# Patient Record
Sex: FEMALE | Race: WHITE | NOT HISPANIC OR LATINO | Employment: UNEMPLOYED | URBAN - NONMETROPOLITAN AREA
[De-identification: names, ages, dates, MRNs, and addresses within clinical notes are randomized per-mention and may not be internally consistent; named-entity substitution may affect disease eponyms.]

---

## 2021-10-12 ENCOUNTER — OFFICE VISIT (OUTPATIENT)
Dept: CARDIOLOGY | Facility: CLINIC | Age: 61
End: 2021-10-12

## 2021-10-12 ENCOUNTER — PATIENT ROUNDING (BHMG ONLY) (OUTPATIENT)
Dept: CARDIOLOGY | Facility: CLINIC | Age: 61
End: 2021-10-12

## 2021-10-12 VITALS
HEART RATE: 69 BPM | SYSTOLIC BLOOD PRESSURE: 160 MMHG | WEIGHT: 256 LBS | HEIGHT: 66 IN | DIASTOLIC BLOOD PRESSURE: 80 MMHG | BODY MASS INDEX: 41.14 KG/M2 | TEMPERATURE: 98.1 F

## 2021-10-12 DIAGNOSIS — R00.2 PALPITATIONS: Primary | ICD-10-CM

## 2021-10-12 DIAGNOSIS — R01.1 MURMUR, CARDIAC: ICD-10-CM

## 2021-10-12 DIAGNOSIS — K21.9 CHRONIC GERD: ICD-10-CM

## 2021-10-12 DIAGNOSIS — E66.01 MORBID OBESITY WITH BMI OF 40.0-44.9, ADULT (HCC): ICD-10-CM

## 2021-10-12 DIAGNOSIS — F41.9 ANXIETY: ICD-10-CM

## 2021-10-12 DIAGNOSIS — I10 PRIMARY HYPERTENSION: ICD-10-CM

## 2021-10-12 PROCEDURE — 99204 OFFICE O/P NEW MOD 45 MIN: CPT | Performed by: INTERNAL MEDICINE

## 2021-10-12 PROCEDURE — 93000 ELECTROCARDIOGRAM COMPLETE: CPT | Performed by: INTERNAL MEDICINE

## 2021-10-12 RX ORDER — CARVEDILOL 6.25 MG/1
6.25 TABLET ORAL 2 TIMES DAILY WITH MEALS
COMMUNITY
End: 2021-12-06 | Stop reason: ALTCHOICE

## 2021-10-12 RX ORDER — CHLORAL HYDRATE 500 MG
1000 CAPSULE ORAL 2 TIMES DAILY WITH MEALS
COMMUNITY

## 2021-10-12 RX ORDER — LORATADINE 10 MG/1
10 TABLET ORAL DAILY
COMMUNITY

## 2021-10-12 RX ORDER — PANTOPRAZOLE SODIUM 40 MG/1
40 TABLET, DELAYED RELEASE ORAL DAILY
COMMUNITY

## 2021-10-12 RX ORDER — MELATONIN
1000 DAILY
COMMUNITY

## 2021-10-12 RX ORDER — FUROSEMIDE 20 MG/1
TABLET ORAL
COMMUNITY

## 2021-10-12 RX ORDER — DICYCLOMINE HYDROCHLORIDE 10 MG/1
10 CAPSULE ORAL 2 TIMES DAILY
Qty: 60 CAPSULE | Refills: 6 | Status: SHIPPED | OUTPATIENT
Start: 2021-10-12 | End: 2022-04-21

## 2021-10-12 NOTE — PROGRESS NOTES
October 12, 2021    Hello, may I speak with Tacho Escalera?    My name is Jena Magallanes      I am  with [facility]  [facility]  [address] [place] [address] [phone].    Before we get started may I verify your date of birth? 1960    I am calling to officially welcome you to our practice and ask about your recent visit. Is this a good time to talk? yes    Tell me about your visit with us. What things went well?  Everything went good and everyone treated me good.        We're always looking for ways to make our patients' experiences even better. Do you have recommendations on ways we may improve?  No-everything went good and they talked to me and kept me from being nervous.They all treated me very well .     Overall were you satisfied with your first visit to our practice? yes       I appreciate you taking the time to speak with me today. Is there anything else I can do for you? no      Thank you, and have a great day.

## 2021-10-12 NOTE — PROGRESS NOTES
"Chief Complaint   Patient presents with   • Establish Care     Referred per PCP for HTN and palpitations . she was saw here in office in 2015 , having Stress test done with results on chart   • Hypertension     Reports her BP is usually normal at home 130/70 and is elevated  when at Dr office.   • Palpitations     Reports she feels \"fluttering \" in her chest . She said she has GI issues and cant tell if is caused from heart or stomach. she does not follow with GI    • LABS     Has some labs on chart , no Lipid panel obtained        CARDIAC COMPLAINTS  fatigue, palpitations and BP      Subjective   Tacho Escalera is a 61 y.o. female came in today for initial cardiac evaluation.  She has history of hypertension, hypercholesterolemia, anxiety who also has history of palpitation.  She was seen here almost 6 years ago for chest pain and shortness of breath.  Cardiac work-up in the form of regular stress test done at that time was inconclusive.  She never came back for any of the follow-up visit and unable to do any changes with the medication.  During the consult her beta-blockers in the form of Lopressor was changed to Coreg for better blood pressure control and heart rate control.  She apparently still continues to take the same medication but in a higher dose.  She is now referred because of palpitation.  The palpitation occurs more of a fluttering feeling in the chest.  She does have a lot of problem with her GI and anytime she has fullness in the chest the fluttering sensation gets worse.  She feels like the heart skipping and racing.  It is not associated with any dizziness or loss of consciousness.  She also claims that her blood pressure at home remains normal but when she goes to the doctor's office it goes high.  She had some labs done in May and found to have abnormal renal function.  Her GFR was normal.  She is not sure when was the last time she had a cholesterol checked.  She also has history of " hyperglycemia in the past and she is not sure when was the last time she had a sugar and A1c level checked.  At this time she denies having any chest pain.  She does have some shortness of breath which is chronic.  She does have anxiety for which she does take Klonopin.  She does not take any long-acting antianxiety medication.  She has no history of smoking.  Her mother  at the age of 80 who had a lot of comorbidity including diabetes, cancer and heart disease.  Her sister  of heart attack and kidney disease.    Past Surgical History:   Procedure Laterality Date   • CARDIOVASCULAR STRESS TEST  2015    R.Stress- 5 Min. 74% THR. BP- 156/80. Inconclusive   • CHOLECYSTECTOMY     • HERNIA REPAIR      umbilical   • HYSTERECTOMY         Current Outpatient Medications   Medication Sig Dispense Refill   • carvedilol (COREG) 6.25 MG tablet Take 6.25 mg by mouth 2 (Two) Times a Day With Meals.     • cholecalciferol (VITAMIN D3) 25 MCG (1000 UT) tablet Take 1,000 Units by mouth Daily.     • clonazePAM (KlonoPIN) 1 MG tablet Take 1 mg by mouth as needed for seizures.     • docusate sodium (COLACE) 250 MG capsule Take 250 mg by mouth Daily.     • enalapril (VASOTEC) 10 MG tablet Take 10 mg by mouth 2 (two) times a day. 2 tabs in AM, 1 tab in PM     • furosemide (LASIX) 20 MG tablet Take 20 mg by mouth As Needed. Takes 1/2 tablet PRN     • HYDROcodone-acetaminophen (NORCO)  MG per tablet Take 1 tablet by mouth 3 (three) times a day as needed for moderate pain (4-6).     • Omega-3 Fatty Acids (fish oil) 1000 MG capsule capsule Take 1,000 mg by mouth Daily With Breakfast.     • pantoprazole (PROTONIX) 40 MG EC tablet Take 40 mg by mouth Daily.     • triamterene-hydrochlorothiazide (MAXZIDE) 75-50 MG per tablet Take 1 tablet by mouth daily.     • dicyclomine (BENTYL) 10 MG capsule Take 1 capsule by mouth 2 (Two) Times a Day. 60 capsule 6   • loratadine (CLARITIN) 10 MG tablet Take 10 mg by mouth Daily.       No  current facility-administered medications for this visit.           ALLERGIES:  Azithromycin, Bactrim [sulfamethoxazole-trimethoprim], Cephalexin, Other, Penicillins, Sulfa antibiotics, and Trimethoprim    Past Medical History:   Diagnosis Date   • Anxiety     panic attacks   • Cancer (HCC)     uterine   • Chronic back pain    • Depression    • Diabetes mellitus (HCC)    • GERD (gastroesophageal reflux disease)    • GERD (gastroesophageal reflux disease)    • H/O hernia repair    • H/O: hysterectomy    • Heart murmur    • History of cholecystectomy    • Hx of appendectomy    • Hypertension        Social History     Tobacco Use   Smoking Status Former Smoker   Smokeless Tobacco Never Used   Tobacco Comment    Quit 2006          Family History   Problem Relation Age of Onset   • Heart attack Mother         multiple. First in 40's   • Hypertension Mother    • Diabetes Mother    • Cancer Mother    • Heart disease Mother    • No Known Problems Father    • Heart disease Other         palpatations   • Hypertension Sister    • Heart attack Sister    • Kidney disease Sister        Review of Systems   Constitutional: Positive for malaise/fatigue and weight gain. Negative for decreased appetite.   HENT: Negative for congestion and sore throat.    Eyes: Negative for blurred vision.   Cardiovascular: Positive for palpitations. Negative for chest pain.   Respiratory: Negative for shortness of breath and snoring.    Endocrine: Negative for cold intolerance and heat intolerance.   Hematologic/Lymphatic: Negative for adenopathy. Does not bruise/bleed easily.   Skin: Negative for itching, nail changes and skin cancer.   Musculoskeletal: Negative for arthritis and myalgias.   Gastrointestinal: Negative for abdominal pain, dysphagia and heartburn.   Genitourinary: Negative for bladder incontinence and frequency.   Neurological: Negative for dizziness, light-headedness, seizures and vertigo.   Psychiatric/Behavioral: Negative for  "altered mental status.   Allergic/Immunologic: Negative for environmental allergies and hives.       Diabetes- No  Thyroid- normal    Objective     /80 (BP Location: Left arm)   Pulse 69   Temp 98.1 °F (36.7 °C)   Ht 167.6 cm (66\")   Wt 116 kg (256 lb)   BMI 41.32 kg/m²     Vitals and nursing note reviewed.   Constitutional:       Appearance: Healthy appearance. Not in distress.   Eyes:      Conjunctiva/sclera: Conjunctivae normal.      Pupils: Pupils are equal, round, and reactive to light.   HENT:      Head: Normocephalic.   Pulmonary:      Effort: Pulmonary effort is normal.      Breath sounds: Normal breath sounds.   Cardiovascular:      PMI at left midclavicular line. Normal rate. Regular rhythm.      Murmurs: There is a systolic murmur.   Abdominal:      General: Bowel sounds are normal.      Palpations: Abdomen is soft.   Musculoskeletal: Normal range of motion.      Cervical back: Normal range of motion and neck supple. Skin:     General: Skin is warm and dry.   Neurological:      Mental Status: Alert, oriented to person, place, and time and oriented to person, place and time.           ECG 12 Lead    Date/Time: 10/12/2021 1:17 PM  Performed by: Ramonita Kebede MD  Authorized by: Ramonita Kebede MD   Comparison: compared with previous ECG from 5/27/2021  Comparison to previous ECG: No PAC today  Rhythm: sinus rhythm  Rate: normal  QRS axis: normal  Other findings: low voltage    Clinical impression: non-specific ECG              Assessment/Plan   Patient's Body mass index is 41.32 kg/m². indicating that she is morbidly obese (BMI > 40 or > 35 with obesity - related health condition). Obesity-related health conditions include the following: hypertension and GERD. Obesity is newly identified. BMI is is above average; BMI management plan is completed. We discussed low calorie, low carb based diet program, portion control and increasing exercise..     Diagnoses and all orders for this " visit:    1. Palpitations (Primary)  -     Adult Transthoracic Echo Complete W/ Cont if Necessary Per Protocol; Future  -     Mobile Cardiac Outpatient Telemetry; Future    2. Primary hypertension    3. Morbid obesity with BMI of 40.0-44.9, adult ([facility])    4. Murmur, cardiac  -     Adult Transthoracic Echo Complete W/ Cont if Necessary Per Protocol; Future    5. Anxiety    6. Chronic GERD  -     dicyclomine (BENTYL) 10 MG capsule; Take 1 capsule by mouth 2 (Two) Times a Day.  Dispense: 60 capsule; Refill: 6    At baseline her heart rate is stable.  Her blood pressure is moderately elevated.  Her BMI is around 41.  She has gained almost 38 pounds in the last 6 years.  Her cardiovascular examination is unremarkable other than a short systolic murmur at the mitral area.    Regarding the palpitation which is the predominant problem she has at this time continue the beta-blockers.  I did place Holter monitor to evaluate for the arrhythmia.  If it is due to multiple PAC's or PVC then will consider adding in 1C antiarrhythmic medication.  If it is due to sinus tachycardia alone then we can increase the dose of beta-blockers.    Regarding her hypertension, it seems to be controlled with a combination of Coreg and Vasotec.  Continue the same and continue the Maxide.  She needs to have her renal function checked along with the electrolytes.    Regarding the obesity, I had a long talk with her about the increase in the weight.  I talked to her about diet.  I gave her papers on Mediterranean diet.  I also talked to her about intermittent fasting diet    Regarding the cardiac murmur, it appears to be secondary to mitral regurgitation.  This may be again from the blood pressure itself.  I did schedule her to undergo an echocardiogram to evaluate for LVH, LV function, valvular structures as well as the PA pressure.    Regarding her anxiety, I did talk to her about taking something long-acting also.  She is advised to talk to you  about it.    Regarding her chronic GERD, continue the PPI.  I did add Bentyl at twice a day.    Based on the results, further recommendations will be made.               Electronically signed by Ramonita Kebede MD October 12, 2021 13:08 EDT

## 2021-10-12 NOTE — PATIENT INSTRUCTIONS
Mediterranean Diet  A Mediterranean diet refers to food and lifestyle choices that are based on the traditions of countries located on the [place]. This way of eating has been shown to help prevent certain conditions and improve outcomes for people who have chronic diseases, like kidney disease and heart disease.  What are tips for following this plan?  Lifestyle  · Cook and eat meals together with your family, when possible.  · Drink enough fluid to keep your urine clear or pale yellow.  · Be physically active every day. This includes:  ? Aerobic exercise like running or swimming.  ? Leisure activities like gardening, walking, or housework.  · Get 7-8 hours of sleep each night.  · If recommended by your health care provider, drink red wine in moderation. This means 1 glass a day for nonpregnant women and 2 glasses a day for men. A glass of wine equals 5 oz (150 mL).  Reading food labels    · Check the serving size of packaged foods. For foods such as rice and pasta, the serving size refers to the amount of cooked product, not dry.  · Check the total fat in packaged foods. Avoid foods that have saturated fat or trans fats.  · Check the ingredients list for added sugars, such as corn syrup.    Shopping  · At the grocery store, buy most of your food from the areas near the walls of the store. This includes:  ? Fresh fruits and vegetables (produce).  ? Grains, beans, nuts, and seeds. Some of these may be available in unpackaged forms or large amounts (in bulk).  ? Fresh seafood.  ? Poultry and eggs.  ? Low-fat dairy products.  · Buy whole ingredients instead of prepackaged foods.  · Buy fresh fruits and vegetables in-season from local farmers markets.  · Buy frozen fruits and vegetables in resealable bags.  · If you do not have access to quality fresh seafood, buy precooked frozen shrimp or canned fish, such as tuna, salmon, or sardines.  · Buy small amounts of raw or cooked vegetables, salads, or olives from  the deli or salad bar at your store.  · Stock your pantry so you always have certain foods on hand, such as olive oil, canned tuna, canned tomatoes, rice, pasta, and beans.  Cooking  · Cook foods with extra-virgin olive oil instead of using butter or other vegetable oils.  · Have meat as a side dish, and have vegetables or grains as your main dish. This means having meat in small portions or adding small amounts of meat to foods like pasta or stew.  · Use beans or vegetables instead of meat in common dishes like chili or lasagna.  · [place] with different cooking methods. Try roasting or broiling vegetables instead of steaming or sautéeing them.  · Add frozen vegetables to soups, stews, pasta, or rice.  · Add nuts or seeds for added healthy fat at each meal. You can add these to yogurt, salads, or vegetable dishes.  · Marinate fish or vegetables using olive oil, lemon juice, garlic, and fresh herbs.  Meal planning    · Plan to eat 1 vegetarian meal one day each week. Try to work up to 2 vegetarian meals, if possible.  · Eat seafood 2 or more times a week.  · Have healthy snacks readily available, such as:  ? Vegetable sticks with hummus.  ? Greek yogurt.  ? Fruit and nut trail mix.  · Eat balanced meals throughout the week. This includes:  ? Fruit: 2-3 servings a day  ? Vegetables: 4-5 servings a day  ? Low-fat dairy: 2 servings a day  ? Fish, poultry, or lean meat: 1 serving a day  ? Beans and legumes: 2 or more servings a week  ? Nuts and seeds: 1-2 servings a day  ? Whole grains: 6-8 servings a day  ? Extra-virgin olive oil: 3-4 servings a day  · Limit red meat and sweets to only a few servings a month    What are my food choices?  · Mediterranean diet  ? Recommended  § Grains: Whole-grain pasta. Brown rice. Bulgar wheat. Polenta. Couscous. Whole-wheat bread. Oatmeal. Quinoa.  § Vegetables: Artichokes. Beets. Broccoli. Cabbage. Carrots. Eggplant. Green beans. Chard. Kale. Spinach. Onions. Leeks. Peas. Squash.  Tomatoes. Peppers. Radishes.  § Fruits: Apples. Apricots. Avocado. Berries. Bananas. Cherries. Dates. Figs. Grapes. [place]. Melon. Oranges. Peaches. Plums. Pomegranate.  § Meats and other protein foods: Beans. Almonds. Sunflower seeds. Pine nuts. Peanuts. Cod. [place]. Scallops. Shrimp. Tuna. Tilapia. Clams. Oysters. Eggs.  § Dairy: Low-fat milk. Cheese. Greek yogurt.  § Beverages: Water. Red wine. Herbal tea.  § Fats and oils: Extra virgin olive oil. Avocado oil. Grape seed oil.  § Sweets and desserts: Greek yogurt with honey. Baked apples. Poached pears. Trail mix.  § Seasoning and other foods: Basil. Cilantro. Coriander. Cumin. Mint. Parsley. [place]. Rosemary. Tarragon. Garlic. Oregano. Thyme. Pepper. Balsalmic vinegar. Tahini. Hummus. Tomato sauce. Olives. Mushrooms.  ? Limit these  § Grains: Prepackaged pasta or rice dishes. Prepackaged cereal with added sugar.  § Vegetables: Deep fried potatoes (french fries).  § Fruits: Fruit canned in syrup.  § Meats and other protein foods: Beef. Pork. Lamb. Poultry with skin. Hot dogs. [place].  § Dairy: Ice cream. Sour cream. Whole milk.  § Beverages: Juice. Sugar-sweetened soft drinks. Beer. Liquor and spirits.  § Fats and oils: Butter. Canola oil. Vegetable oil. Beef fat (tallow). Lard.  § Sweets and desserts: Cookies. Cakes. Pies. Candy.  § Seasoning and other foods: Mayonnaise. Premade sauces and marinades.  The items listed may not be a complete list. Talk with your dietitian about what dietary choices are right for you.  Summary  · The Mediterranean diet includes both food and lifestyle choices.  · Eat a variety of fresh fruits and vegetables, beans, nuts, seeds, and whole grains.  · Limit the amount of red meat and sweets that you eat.  · Talk with your health care provider about whether it is safe for you to drink red wine in moderation. This means 1 glass a day for nonpregnant women and 2 glasses a day for men. A glass of wine equals 5 oz (150 mL).  This information  is not intended to replace advice given to you by your health care provider. Make sure you discuss any questions you have with your health care provider.  Document Revised: 08/17/2017 Document Reviewed: 08/10/2017  Elsevier Patient Education © 2020 [employer]

## 2021-11-03 ENCOUNTER — APPOINTMENT (OUTPATIENT)
Dept: CARDIOLOGY | Facility: HOSPITAL | Age: 61
End: 2021-11-03

## 2021-12-03 ENCOUNTER — TELEPHONE (OUTPATIENT)
Dept: CARDIOLOGY | Facility: CLINIC | Age: 61
End: 2021-12-03

## 2021-12-03 NOTE — TELEPHONE ENCOUNTER
Pt called, MANUEL asking to be seen next week to discuss echo results that is to be done on the 8th. Called her back, MANUEL for her to return my call.

## 2021-12-06 RX ORDER — DILTIAZEM HYDROCHLORIDE 240 MG/1
240 CAPSULE, COATED, EXTENDED RELEASE ORAL DAILY
Qty: 90 CAPSULE | Refills: 3 | Status: SHIPPED | OUTPATIENT
Start: 2021-12-06 | End: 2021-12-20 | Stop reason: DRUGHIGH

## 2021-12-06 NOTE — TELEPHONE ENCOUNTER
I called pt back, advised her that we would be calling her with the echo results and any recommendations. Understanding voiced.

## 2021-12-06 NOTE — TELEPHONE ENCOUNTER
----- Message from Ramonita Kebede MD sent at 12/4/2021  1:55 PM EST -----  Can change Coreg to Cardizem 240 or add Flecainide

## 2021-12-06 NOTE — TELEPHONE ENCOUNTER
Pt aware of monitor results and recommendations to either change Coreg to Cardizem 240 mg or add Flecainide. Pt would like to change the Coreg to Cardizem, if that doesn't work then try the Flecainide.

## 2021-12-08 ENCOUNTER — TELEPHONE (OUTPATIENT)
Dept: CARDIOLOGY | Facility: CLINIC | Age: 61
End: 2021-12-08

## 2021-12-08 ENCOUNTER — HOSPITAL ENCOUNTER (OUTPATIENT)
Dept: CARDIOLOGY | Facility: HOSPITAL | Age: 61
Discharge: HOME | End: 2021-12-08
Admitting: INTERNAL MEDICINE

## 2021-12-08 VITALS — WEIGHT: 255.73 LBS | HEIGHT: 66 IN | BODY MASS INDEX: 41.1 KG/M2

## 2021-12-08 DIAGNOSIS — R00.2 PALPITATIONS: ICD-10-CM

## 2021-12-08 DIAGNOSIS — R01.1 MURMUR, CARDIAC: ICD-10-CM

## 2021-12-08 LAB
AORTIC DIMENSIONLESS INDEX: 0.7 (DI)
BH CV ECHO MEAS - ACS: 1.8 CM
BH CV ECHO MEAS - AO MAX PG (FULL): 6.2 MMHG
BH CV ECHO MEAS - AO MAX PG: 14.7 MMHG
BH CV ECHO MEAS - AO MEAN PG (FULL): 3 MMHG
BH CV ECHO MEAS - AO MEAN PG: 7 MMHG
BH CV ECHO MEAS - AO ROOT AREA (BSA CORRECTED): 1.5
BH CV ECHO MEAS - AO ROOT AREA: 8.6 CM^2
BH CV ECHO MEAS - AO ROOT DIAM: 3.3 CM
BH CV ECHO MEAS - AO V2 MAX: 192 CM/SEC
BH CV ECHO MEAS - AO V2 MEAN: 124 CM/SEC
BH CV ECHO MEAS - AO V2 VTI: 42 CM
BH CV ECHO MEAS - BSA(HAYCOCK): 2.4 M^2
BH CV ECHO MEAS - BSA: 2.2 M^2
BH CV ECHO MEAS - BZI_BMI: 41.3 KILOGRAMS/M^2
BH CV ECHO MEAS - BZI_METRIC_HEIGHT: 167.6 CM
BH CV ECHO MEAS - BZI_METRIC_WEIGHT: 116.1 KG
BH CV ECHO MEAS - EDV(CUBED): 77.3 ML
BH CV ECHO MEAS - EDV(TEICH): 81.3 ML
BH CV ECHO MEAS - EF(CUBED): 62.6 %
BH CV ECHO MEAS - EF(MOD-BP): 54 %
BH CV ECHO MEAS - EF(TEICH): 54.4 %
BH CV ECHO MEAS - ESV(CUBED): 28.9 ML
BH CV ECHO MEAS - ESV(TEICH): 37 ML
BH CV ECHO MEAS - FS: 27.9 %
BH CV ECHO MEAS - IVS/LVPW: 1.3
BH CV ECHO MEAS - IVSD: 1.3 CM
BH CV ECHO MEAS - LA DIMENSION: 4.3 CM
BH CV ECHO MEAS - LA/AO: 1.3
BH CV ECHO MEAS - LAT PEAK E' VEL: 11.3 CM/SEC
BH CV ECHO MEAS - LV IVRT: 0.1 SEC
BH CV ECHO MEAS - LV MASS(C)D: 181 GRAMS
BH CV ECHO MEAS - LV MASS(C)DI: 81.5 GRAMS/M^2
BH CV ECHO MEAS - LV MAX PG: 8.5 MMHG
BH CV ECHO MEAS - LV MEAN PG: 4 MMHG
BH CV ECHO MEAS - LV V1 MAX: 146 CM/SEC
BH CV ECHO MEAS - LV V1 MEAN: 89.5 CM/SEC
BH CV ECHO MEAS - LV V1 VTI: 35.9 CM
BH CV ECHO MEAS - LVIDD: 4.3 CM
BH CV ECHO MEAS - LVIDS: 3.1 CM
BH CV ECHO MEAS - LVPWD: 1.1 CM
BH CV ECHO MEAS - MED PEAK E' VEL: 7.8 CM/SEC
BH CV ECHO MEAS - MV A MAX VEL: 94.5 CM/SEC
BH CV ECHO MEAS - MV DEC SLOPE: 523 CM/SEC^2
BH CV ECHO MEAS - MV DEC TIME: 0.22 SEC
BH CV ECHO MEAS - MV E MAX VEL: 67.1 CM/SEC
BH CV ECHO MEAS - MV E/A: 0.71
BH CV ECHO MEAS - MV MAX PG: 5.6 MMHG
BH CV ECHO MEAS - MV MEAN PG: 2 MMHG
BH CV ECHO MEAS - MV P1/2T MAX VEL: 106 CM/SEC
BH CV ECHO MEAS - MV P1/2T: 59.4 MSEC
BH CV ECHO MEAS - MV V2 MAX: 118 CM/SEC
BH CV ECHO MEAS - MV V2 MEAN: 64.6 CM/SEC
BH CV ECHO MEAS - MV V2 VTI: 32 CM
BH CV ECHO MEAS - MVA P1/2T LCG: 2.1 CM^2
BH CV ECHO MEAS - MVA(P1/2T): 3.7 CM^2
BH CV ECHO MEAS - RAP SYSTOLE: 10 MMHG
BH CV ECHO MEAS - RVDD: 3.1 CM
BH CV ECHO MEAS - RVSP: 21 MMHG
BH CV ECHO MEAS - SI(AO): 161.7 ML/M^2
BH CV ECHO MEAS - SI(CUBED): 21.8 ML/M^2
BH CV ECHO MEAS - SI(TEICH): 19.9 ML/M^2
BH CV ECHO MEAS - SV(AO): 359.2 ML
BH CV ECHO MEAS - SV(CUBED): 48.4 ML
BH CV ECHO MEAS - SV(TEICH): 44.2 ML
BH CV ECHO MEAS - TR MAX VEL: 165 CM/SEC
BH CV ECHO MEASUREMENTS AVERAGE E/E' RATIO: 7.03
MAXIMAL PREDICTED HEART RATE: 158 BPM
SINUS: 3 CM
STRESS TARGET HR: 134 BPM

## 2021-12-08 PROCEDURE — 93306 TTE W/DOPPLER COMPLETE: CPT | Performed by: INTERNAL MEDICINE

## 2021-12-08 PROCEDURE — 93306 TTE W/DOPPLER COMPLETE: CPT

## 2021-12-08 NOTE — TELEPHONE ENCOUNTER
Spoke at length with patient explaining the difference in medications and mgs. Understanding voiced.

## 2021-12-08 NOTE — TELEPHONE ENCOUNTER
PATIENT CAME BY AFTER ECHO AND WAS PRACTICALLY IN TEARS.  SHE IS ON NERVE MEDICATION AND MADE IT THROUGH THE ECHO BUT WAS UPSET.  SHE WANTED TO CHECK ON THE MEDICATION SHE WAS ON COREG 6.25 AND NO POSSIBLY CARDIZEM.   SHE DID NOT UNDERSTAND WHY SHE HAD TO INCREASE TO SUCH A LARGE DOSAGE.    I LET PATIENT KNOW THAT WHEN CHANGING FROM ONE MEDICATION TO ANOTHER THAT THE DOSAGE DID NOT MEAN THEY WERE INCREASING BECAUSE THEY ARE MADE DIFFERENT AND OFFER IN DIFFERENT STRENGTHS.    SHE IS AWARE YOU WILL BE CALLING TO GO OVER.  I THINK SHE FELT A LITTLE BETTER.

## 2021-12-20 RX ORDER — DILTIAZEM HYDROCHLORIDE 120 MG/1
120 CAPSULE, COATED, EXTENDED RELEASE ORAL DAILY
Qty: 90 CAPSULE | Refills: 3 | Status: SHIPPED | OUTPATIENT
Start: 2021-12-20 | End: 2022-01-24 | Stop reason: ALTCHOICE

## 2021-12-20 NOTE — TELEPHONE ENCOUNTER
Pt aware to try Diltiazem 120 mg once daily instead of the 240 mg. Understanding voiced. Sending to pharmacy.

## 2021-12-20 NOTE — TELEPHONE ENCOUNTER
"Pt called asking for an appointment. After a long discussion, she said that she really feels like the Diltiazem 240 mg daily is \" just to strong\". Complains of extreme fatigue. Reports heart rate in the 50's, last time BP was checked 120/65 but has no way of checking it now. She stopped the Diltiazem and went back on the Coreg 6.25 mg BID. Asking if you think a lower dose of the Diltiazem would work.       Current meds include:     Maxzide 75/50 mg 1 daily  Coreg 6.25 mg BID  Enalapril 10 mg 2 tabs in AM, 1 tab in PM  Lasix 20 mg 1/2 tab PRN    "

## 2022-01-05 ENCOUNTER — TELEPHONE (OUTPATIENT)
Dept: CARDIOLOGY | Facility: CLINIC | Age: 62
End: 2022-01-05

## 2022-01-05 NOTE — TELEPHONE ENCOUNTER
"Pt LM asking to be called back, had \" a medicine question\". Called her back no answer,LM for her to call me back at her convenience.   "

## 2022-01-11 NOTE — TELEPHONE ENCOUNTER
Called pt back, she was having some issues with her BP elevated, saw PCP he adjusted medication and she is to see him today to follow up. Aware to keep scheduled appointment with us unless she needs us sooner to call.

## 2022-01-20 ENCOUNTER — TELEPHONE (OUTPATIENT)
Dept: CARDIOLOGY | Facility: CLINIC | Age: 62
End: 2022-01-20

## 2022-01-20 NOTE — TELEPHONE ENCOUNTER
Pt called, reports hearing her heart beat in one ear when she has a bowel movement, no other time. She wanted me to check with you to see if you feel it could be heart related.  BP running 130's/ 70-80, pulse in the 60's-80's. She did say she has had a lot of sinus problems and feels like she might have fluid behind her ear. I advised her to notify her PCP to be evaluated. Understanding voiced.

## 2022-01-24 RX ORDER — CARVEDILOL 6.25 MG/1
6.25 TABLET ORAL 2 TIMES DAILY
Qty: 180 TABLET | Refills: 3 | Status: SHIPPED | OUTPATIENT
Start: 2022-01-24 | End: 2022-04-21 | Stop reason: SDUPTHER

## 2022-01-24 NOTE — TELEPHONE ENCOUNTER
Pt called back, asking you to change the Cardizem 120 mg daily.  Said that she had never had those feeling with her heart beating in her head when her bowels move or been near as nervous until she started taking that. She just wants it changed. Asking if she could go back on her Coreg, was taking 6.25 mg BID and maybe just increase it.   (After monitor results you gave the choice to stop Coreg and start Cardizem or add flecainide to the Coreg. She wanted to try the Cardizem.)    Today BP was 171/77 pulse 78-92.     Current meds include:    Diltiazem  mg daily  Enalapril 10  Mg twice a day  Lasix 20 mg 1/2 tab prn  Maxzide 75/50 mg daily

## 2022-02-08 ENCOUNTER — TELEPHONE (OUTPATIENT)
Dept: CARDIOLOGY | Facility: CLINIC | Age: 62
End: 2022-02-08

## 2022-02-08 NOTE — TELEPHONE ENCOUNTER
Patient called stating that she would like an appointment with you because she had a feeling like a esophagus spasm or palpitations today, she states that she has only one episode total and that it only happened today. She states that it has gone away. She states that her HR has been 69 and BP has been around 130/60.       She is taking:  · Coreg 6.25 mg BID  · Enalapril 10 mg 2 tablets in the morning and 1 tablet in the pm  · Lasix 20 mg 1/2 tablet PRN  · maxzide 75/50 mg daily    She was made aware that I would let you know, but that since it has only happened one time, I did not think that you would need to see her. She is asking if you need to add another beta blocker or change it so that this does not happen again.

## 2022-02-09 NOTE — TELEPHONE ENCOUNTER
Patient was made aware that she could take an extra 1/2 tablet of coreg on day that she has palpitations.

## 2022-04-21 ENCOUNTER — OFFICE VISIT (OUTPATIENT)
Dept: CARDIOLOGY | Facility: CLINIC | Age: 62
End: 2022-04-21

## 2022-04-21 VITALS
DIASTOLIC BLOOD PRESSURE: 100 MMHG | HEIGHT: 66 IN | HEART RATE: 68 BPM | BODY MASS INDEX: 40.6 KG/M2 | SYSTOLIC BLOOD PRESSURE: 150 MMHG | WEIGHT: 252.6 LBS

## 2022-04-21 DIAGNOSIS — I49.3 PVC (PREMATURE VENTRICULAR CONTRACTION): ICD-10-CM

## 2022-04-21 DIAGNOSIS — K21.9 CHRONIC GERD: ICD-10-CM

## 2022-04-21 DIAGNOSIS — I10 PRIMARY HYPERTENSION: Primary | ICD-10-CM

## 2022-04-21 DIAGNOSIS — F41.9 ANXIETY: ICD-10-CM

## 2022-04-21 DIAGNOSIS — R00.2 PALPITATIONS: ICD-10-CM

## 2022-04-21 DIAGNOSIS — I49.1 PREMATURE ATRIAL CONTRACTIONS: ICD-10-CM

## 2022-04-21 PROCEDURE — 99214 OFFICE O/P EST MOD 30 MIN: CPT | Performed by: NURSE PRACTITIONER

## 2022-04-21 RX ORDER — CARVEDILOL 6.25 MG/1
6.25 TABLET ORAL 2 TIMES DAILY
Qty: 270 TABLET | Refills: 3 | Status: SHIPPED | OUTPATIENT
Start: 2022-04-21 | End: 2022-06-14 | Stop reason: SDUPTHER

## 2022-04-21 RX ORDER — CLONAZEPAM 0.5 MG/1
0.5 TABLET ORAL 2 TIMES DAILY PRN
COMMUNITY
End: 2022-11-07

## 2022-04-21 NOTE — PROGRESS NOTES
Chief Complaint   Patient presents with   • Follow-up     Cardiac management     • Lab     Last labs yesterday per PCP.   • Palpitations     Has occasional flutter, not often. Heart rate doing much better since stopping Diltiazem.    • Med Refill     Does not need refills at this time. Patient went over medications verbally.   • Blood pressure     She monitors at home, averages 130//70. Reports that she gets nervous when coming to doctor.     Subjective       Brianna Escalera is a 62 y.o. female with HTN, hypercholesterolemia and palpitations who also has significant anxiety.  She was referred back in October 2021 after not being seen for about 6 years.  Past cardiac work-up included regular stress test which was inconclusive.  Lopressor was changed to Coreg for better BP and HR control.  When she returned, she was still taking carvedilol but admitted to increasing palpitations and fluttering.  She associated much of her symptoms with GI fullness which triggered the fluttering.  She also claimed that her blood pressure at home was normal but goes high when she is at the doctor's office.  Echocardiogram and Holter monitor ordered at consult.  She had normal LV function, mild MR and normal PA pressure.  Holter worn for 1 day showed average heart rate 63 with 6% PACs and 1% PVC.  We tried putting her on diltiazem but she did not tolerate and wanted to go back on carvedilol. Labs 5/25/2021 showed GFR 71, normal sodium and potassium.    She returns today for follow-up visit.  She denies chest pain but continues to have occasional flutter but not often.  She at this time feels she is well controlled.  There was discussion about changing beta-blocker to 1C antiarrhythmic for persistent palpitations.  Her labs were done yesterday at [facility].  Copy is not available.  BP at home ranges 130-150 systolic.         Cardiac History:    Past Surgical History:   Procedure Laterality Date   • CARDIOVASCULAR STRESS  TEST  11/17/2015    R.Stress- 5 Min. 74% THR. BP- 156/80. Inconclusive   • CHOLECYSTECTOMY     • ECHO - CONVERTED  12/08/2021    TLS.EF 60%. Trace -Mild MR. RVSP- 21 mmHg   • HERNIA REPAIR      umbilical   • HYSTERECTOMY     • OTHER SURGICAL HISTORY  12/04/2021    Event monitor- 1 day. Avg 63. . 6% PAC. 1 % PVC     Current Outpatient Medications   Medication Sig Dispense Refill   • carvedilol (COREG) 6.25 MG tablet Take 1 tablet by mouth 2 (Two) Times a Day. Take extra tablet as needed 270 tablet 3   • clonazePAM (KlonoPIN) 0.5 MG tablet Take 0.5 mg by mouth 2 (Two) Times a Day As Needed. 2 tablets in morning, 1 tablet at noon and 1 tablet at night.     • docusate sodium (COLACE) 250 MG capsule Take 250 mg by mouth Daily.     • enalapril (VASOTEC) 10 MG tablet 2 tabs in AM, 1 tab in PM     • furosemide (LASIX) 20 MG tablet Takes 1/2 tablet PRN     • HYDROcodone-acetaminophen (NORCO)  MG per tablet Take 1 tablet by mouth 3 (three) times a day as needed for moderate pain (4-6).     • loratadine (CLARITIN) 10 MG tablet Take 10 mg by mouth Daily.     • Omega-3 Fatty Acids (fish oil) 1000 MG capsule capsule Take 1,000 mg by mouth 2 (Two) Times a Day With Meals.     • pantoprazole (PROTONIX) 40 MG EC tablet Take 40 mg by mouth Daily.     • triamterene-hydrochlorothiazide (MAXZIDE) 75-50 MG per tablet Take 1 tablet by mouth daily.     • cholecalciferol (VITAMIN D3) 25 MCG (1000 UT) tablet Take 1,000 Units by mouth Daily.       No current facility-administered medications for this visit.     Azithromycin, Bactrim [sulfamethoxazole-trimethoprim], Cephalexin, Diltiazem, Norvasc [amlodipine], Other, Penicillins, Sulfa antibiotics, and Trimethoprim    Past Medical History:   Diagnosis Date   • Anxiety     panic attacks   • Cancer (HCC)     uterine   • Chronic back pain    • Depression    • Diabetes mellitus (HCC)    • GERD (gastroesophageal reflux disease)    • GERD (gastroesophageal reflux disease)    • H/O hernia  "repair    • H/O: hysterectomy    • Heart murmur    • History of cholecystectomy    • Hx of appendectomy    • Hypertension      Social History     Socioeconomic History   • Marital status:    Tobacco Use   • Smoking status: Former Smoker   • Smokeless tobacco: Never Used   • Tobacco comment: Quit 2006   Vaping Use   • Vaping Use: Never used   Substance and Sexual Activity   • Alcohol use: No   • Drug use: Never   • Sexual activity: Defer     Family History   Problem Relation Age of Onset   • Heart attack Mother         multiple. First in 40's   • Hypertension Mother    • Diabetes Mother    • Cancer Mother    • Heart disease Mother    • No Known Problems Father    • Heart disease Other         palpatations   • Hypertension Sister    • Heart attack Sister    • Kidney disease Sister      Review of Systems   Constitutional: Positive for weight loss (Down 3 pounds). Negative for decreased appetite and malaise/fatigue.   HENT: Negative.    Eyes: Negative for blurred vision.   Cardiovascular: Positive for dyspnea on exertion and palpitations. Negative for chest pain, leg swelling and syncope.   Respiratory: Negative for shortness of breath and sleep disturbances due to breathing.    Endocrine: Negative.    Hematologic/Lymphatic: Negative for bleeding problem. Does not bruise/bleed easily.   Skin: Negative.    Musculoskeletal: Negative for falls and myalgias.   Gastrointestinal: Negative for abdominal pain, heartburn and melena.   Genitourinary: Negative for hematuria.   Neurological: Negative for dizziness and light-headedness.   Psychiatric/Behavioral: Negative for altered mental status. The patient is nervous/anxious.    Allergic/Immunologic: Negative.       Objective     /100 (BP Location: Left arm)   Pulse 68   Ht 167 cm (65.75\")   Wt 115 kg (252 lb 9.6 oz)   BMI 41.08 kg/m²     Vitals and nursing note reviewed.   Constitutional:       General: Not in acute distress.     Appearance: Well-developed. Not " diaphoretic.   Eyes:      Pupils: Pupils are equal, round, and reactive to light.   HENT:      Head: Normocephalic.   Pulmonary:      Effort: Pulmonary effort is normal. No respiratory distress.      Breath sounds: Normal breath sounds.   Cardiovascular:      Normal rate. Regular rhythm.      Murmurs: There is a grade 1/6 systolic murmur.   Pulses:     Intact distal pulses.   Abdominal:      General: Bowel sounds are normal.      Palpations: Abdomen is soft.   Musculoskeletal: Normal range of motion.      Cervical back: Normal range of motion. Skin:     General: Skin is warm and dry.   Neurological:      Mental Status: Alert and oriented to person, place, and time.        Procedures          Problem List Items Addressed This Visit        Cardiac and Vasculature    Primary hypertension - Primary    Relevant Medications    carvedilol (COREG) 6.25 MG tablet    Palpitations    Premature atrial contractions    Relevant Medications    carvedilol (COREG) 6.25 MG tablet    PVC (premature ventricular contraction)    Relevant Medications    carvedilol (COREG) 6.25 MG tablet       Gastrointestinal Abdominal     Chronic GERD       Mental Health    Anxiety         1.  Palpitations- Holter revealed PACs and PVCs, reviewed with patient.  Did not tolerate calcium channel blocker.  We discussed changing carvedilol to 1C antiarrhythmic but she feels she is fairly well controlled.  She was advised to take extra dose of carvedilol as needed for palpitations.  Refill sent.  Labs including TSH and mag followed by PCP.  Could we get copy of most recent labs?    2.  HTN- elevated at 150/100, patient admits to whitecoat syndrome.  Checked at end of visit, improved to 146/92.  Since she has normal readings at home, advised to continue Maxide, enalapril and Coreg.  If blood pressure elevates, consider increasing enalapril to 20 mg twice daily.  Limit sodium.  Continue weight loss efforts.    3.  Chronic GERD- she has responded well to  dicyclomine, continue the same with PPI.    4.  Hypercholesterolemia-appears to be managed with diet and fish oil.  Could we get copy of her most recent lipids?    She appears stable.  Echo and Holter reviewed with her.  We will see her back in 6 months or sooner if needed.    Patient's Body mass index is 41.08 kg/m². indicating that she is obese (BMI >30). Obesity-related health conditions include the following: obstructive sleep apnea, hypertension, coronary heart disease, diabetes mellitus, dyslipidemias and GERD. Obesity is improving with lifestyle modifications. BMI is is above average; BMI management plan is completed. We discussed portion control and increasing exercise..             Electronically signed by MONIQUE Orona,  April 25, 2022 08:29 EDT

## 2022-04-25 PROBLEM — I49.3 PVC (PREMATURE VENTRICULAR CONTRACTION): Status: ACTIVE | Noted: 2022-04-25

## 2022-04-25 PROBLEM — I49.1 PREMATURE ATRIAL CONTRACTIONS: Status: ACTIVE | Noted: 2022-04-25

## 2022-04-25 PROBLEM — Z15.89 MONOALLELIC MUTATION OF PACS1 GENE: Status: ACTIVE | Noted: 2022-04-25

## 2022-04-25 PROBLEM — Z15.89 MONOALLELIC MUTATION OF PACS1 GENE: Status: RESOLVED | Noted: 2022-04-25 | Resolved: 2022-04-25

## 2022-05-11 DIAGNOSIS — K21.9 CHRONIC GERD: ICD-10-CM

## 2022-05-11 RX ORDER — DICYCLOMINE HYDROCHLORIDE 10 MG/1
CAPSULE ORAL
Qty: 60 CAPSULE | Refills: 5 | OUTPATIENT
Start: 2022-05-11

## 2022-06-09 ENCOUNTER — TELEPHONE (OUTPATIENT)
Dept: CARDIOLOGY | Facility: CLINIC | Age: 62
End: 2022-06-09

## 2022-06-10 NOTE — TELEPHONE ENCOUNTER
Looks like Ramandeep has an opening Monday at 9. Since she saw her before, will this work for patient?

## 2022-06-10 NOTE — TELEPHONE ENCOUNTER
"I spoke with patient and she just wanted to speak with someone  about her \"fluttering \" in chest  .HR is normal in the 60's /69 . She wants to move appointment up .  "

## 2022-06-14 ENCOUNTER — OFFICE VISIT (OUTPATIENT)
Dept: CARDIOLOGY | Facility: CLINIC | Age: 62
End: 2022-06-14

## 2022-06-14 VITALS
HEIGHT: 66 IN | DIASTOLIC BLOOD PRESSURE: 80 MMHG | BODY MASS INDEX: 39.7 KG/M2 | HEART RATE: 68 BPM | SYSTOLIC BLOOD PRESSURE: 130 MMHG | WEIGHT: 247 LBS

## 2022-06-14 DIAGNOSIS — I10 PRIMARY HYPERTENSION: Primary | ICD-10-CM

## 2022-06-14 DIAGNOSIS — I49.3 PVC (PREMATURE VENTRICULAR CONTRACTION): ICD-10-CM

## 2022-06-14 DIAGNOSIS — R00.2 PALPITATIONS: ICD-10-CM

## 2022-06-14 DIAGNOSIS — K21.9 CHRONIC GERD: ICD-10-CM

## 2022-06-14 DIAGNOSIS — F41.9 ANXIETY: ICD-10-CM

## 2022-06-14 DIAGNOSIS — I49.1 PREMATURE ATRIAL CONTRACTIONS: ICD-10-CM

## 2022-06-14 PROCEDURE — 99214 OFFICE O/P EST MOD 30 MIN: CPT | Performed by: NURSE PRACTITIONER

## 2022-06-14 RX ORDER — CARVEDILOL 6.25 MG/1
TABLET ORAL
Qty: 270 TABLET | Refills: 3 | Status: SHIPPED | OUTPATIENT
Start: 2022-06-14

## 2022-06-14 RX ORDER — DICYCLOMINE HYDROCHLORIDE 10 MG/1
10 CAPSULE ORAL 2 TIMES DAILY
Qty: 60 CAPSULE | Refills: 8 | Status: SHIPPED | OUTPATIENT
Start: 2022-06-14

## 2022-06-14 NOTE — PROGRESS NOTES
Chief Complaint   Patient presents with   • Follow-up     Cardiac management     • Lab     Last labs 2 months ago per PCP.   • Medication     She has questions concerning Coreg dosing. She is having episodes of fluttering at times, unable to determine if issue from stomach issues or anxiety. She reports heart rate has been in the 60's. Has noticed at times fluttering relieved with use of Mylanta.     Subjective       Brianna Escalera is a 62 y.o. female with HTN, hypercholesterolemia and palpitations who also has significant anxiety.  She was referred back in October 2021 after not being seen for about 6 years.  Stress test in 2015 was inconclusive.  Lopressor changed to Coreg for better BP and HR control.  When she returned, she was still taking carvedilol but admitted to increasing palpitations and fluttering.  She associated much of her symptoms with GI fullness triggering the fluttering.  Echo and Holter showed normal LV function, average HR 63 with 6% PACs and 1% PVC.  We tried diltiazem but did not tolerate.  She wanted to go back on carvedilol. Labs 5/25/2021 showed GFR 71, normal sodium and potassium.     She returns today to be evaluated for increasing palpitations. She is not sure if heart flutters and skips due to anxiety and stress as palpitations worsened after stopping valium and starting clonazepam. He daughter recently passed away from cancer and she has been very depressed. She also notes increasing palpitations after eating. She takes Mylanta and palpitations improve. Labs followed by PCP.            Cardiac History:    Past Surgical History:   Procedure Laterality Date   • CARDIOVASCULAR STRESS TEST  11/17/2015    R.Stress- 5 Min. 74% THR. BP- 156/80. Inconclusive   • ECHO - CONVERTED  12/08/2021    TLS.EF 60%. Trace -Mild MR. RVSP- 21 mmHg   • OTHER SURGICAL HISTORY  12/04/2021    Event monitor- 1 day. Avg 63. . 6% PAC. 1 % PVC     Current Outpatient Medications   Medication Sig Dispense  Refill   • carvedilol (COREG) 6.25 MG tablet Take 2 tablets in am, 1 tablet in pm 270 tablet 3   • cholecalciferol (VITAMIN D3) 25 MCG (1000 UT) tablet Take 1,000 Units by mouth Daily.     • clonazePAM (KlonoPIN) 0.5 MG tablet Take 0.5 mg by mouth 2 (Two) Times a Day As Needed. 2 tablets in morning, 1 tablet at noon and 1 tablet at night.     • docusate sodium (COLACE) 250 MG capsule Take 250 mg by mouth Daily.     • enalapril (VASOTEC) 10 MG tablet 2 tabs in AM, 1 tab in PM     • furosemide (LASIX) 20 MG tablet Takes 1/2 tablet PRN     • HYDROcodone-acetaminophen (NORCO)  MG per tablet Take 1 tablet by mouth 3 (three) times a day as needed for moderate pain (4-6).     • loratadine (CLARITIN) 10 MG tablet Take 10 mg by mouth Daily.     • Omega-3 Fatty Acids (fish oil) 1000 MG capsule capsule Take 1,000 mg by mouth 2 (Two) Times a Day With Meals.     • pantoprazole (PROTONIX) 40 MG EC tablet Take 40 mg by mouth Daily.     • triamterene-hydrochlorothiazide (MAXZIDE) 75-50 MG per tablet Take 1 tablet by mouth daily.     • dicyclomine (Bentyl) 10 MG capsule Take 1 capsule by mouth 2 (Two) Times a Day. Before meals 60 capsule 8     No current facility-administered medications for this visit.     Azithromycin, Bactrim [sulfamethoxazole-trimethoprim], Cephalexin, Diltiazem, Norvasc [amlodipine], Other, Penicillins, Sulfa antibiotics, and Trimethoprim    Past Medical History:   Diagnosis Date   • Anxiety     panic attacks   • Cancer (HCC)     uterine   • Chronic back pain    • Depression    • Diabetes mellitus (HCC)    • GERD (gastroesophageal reflux disease)    • GERD (gastroesophageal reflux disease)    • H/O hernia repair    • H/O: hysterectomy    • Heart murmur    • History of cholecystectomy    • Hx of appendectomy    • Hypertension      Social History     Socioeconomic History   • Marital status:    Tobacco Use   • Smoking status: Former Smoker   • Smokeless tobacco: Never Used   • Tobacco comment: Quit  "2006   Vaping Use   • Vaping Use: Never used   Substance and Sexual Activity   • Alcohol use: No   • Drug use: Never   • Sexual activity: Defer     Family History   Problem Relation Age of Onset   • Heart attack Mother         multiple. First in 40's   • Hypertension Mother    • Diabetes Mother    • Cancer Mother    • Heart disease Mother    • No Known Problems Father    • Heart disease Other         palpatations   • Hypertension Sister    • Heart attack Sister    • Kidney disease Sister      Review of Systems   Constitutional: Negative for decreased appetite and malaise/fatigue.   HENT: Negative.    Eyes: Negative for blurred vision.   Cardiovascular: Positive for palpitations. Negative for chest pain, dyspnea on exertion, leg swelling and syncope.   Respiratory: Negative for shortness of breath and sleep disturbances due to breathing.    Endocrine: Negative.    Hematologic/Lymphatic: Negative for bleeding problem. Does not bruise/bleed easily.   Skin: Negative.    Musculoskeletal: Negative for falls and myalgias.   Gastrointestinal: Negative for abdominal pain, heartburn and melena.   Genitourinary: Negative for hematuria.   Neurological: Negative for dizziness and light-headedness.   Psychiatric/Behavioral: Negative for altered mental status.   Allergic/Immunologic: Negative.       Objective     /80 (BP Location: Left arm)   Pulse 68   Ht 167 cm (65.75\")   Wt 112 kg (247 lb)   BMI 40.17 kg/m²     Vitals and nursing note reviewed.   Constitutional:       General: Not in acute distress.     Appearance: Well-developed. Not diaphoretic.   Eyes:      Pupils: Pupils are equal, round, and reactive to light.   HENT:      Head: Normocephalic.   Pulmonary:      Effort: Pulmonary effort is normal. No respiratory distress.      Breath sounds: Normal breath sounds.   Cardiovascular:      Normal rate. Regular rhythm.   Pulses:     Intact distal pulses.   Edema:     Peripheral edema absent.   Abdominal:      General: " Bowel sounds are normal.      Palpations: Abdomen is soft.   Musculoskeletal: Normal range of motion.      Cervical back: Normal range of motion. Skin:     General: Skin is warm and dry.   Neurological:      Mental Status: Alert and oriented to person, place, and time.        Procedures          Problem List Items Addressed This Visit        Cardiac and Vasculature    Primary hypertension - Primary    Relevant Medications    carvedilol (COREG) 6.25 MG tablet    Palpitations    Premature atrial contractions    Relevant Medications    carvedilol (COREG) 6.25 MG tablet    PVC (premature ventricular contraction)    Relevant Medications    carvedilol (COREG) 6.25 MG tablet       Gastrointestinal Abdominal     Chronic GERD    Relevant Medications    dicyclomine (Bentyl) 10 MG capsule       Mental Health    Anxiety         1. Palpitations- PAC and PVC per holter. Increase Coreg to 12.5 mg in am, 6.25mg in pm. Monitor HR. If becomes bradycardic, will try 6.25 mg TID. She feels comfortable with Coreg as she has not tolerated other bb/ccb. If palpitations remain frequent, will add flecainide. Check TSH, MAG, electrolytes per PCP.     2. HTN- well controlled at 130/80. Continue low sodium diet. She has lost 5 lb.     3. Chronic GERD- palpitations relieved with Mylanta, advised to resume Bentyl. Continue PPI.     4. Hyperlipidemia- managed with fish oil and diet. Could we get copy of most recent labs?     Class 3 Severe Obesity (BMI >=40). Obesity-related health conditions include the following: obstructive sleep apnea, hypertension, coronary heart disease, diabetes mellitus, dyslipidemias and GERD. Obesity is improving with lifestyle modifications. BMI is is above average; BMI management plan is completed. We discussed portion control and increasing exercise.               Electronically signed by MONIQUE Orona,  Regina 15, 2022 08:27 EDT

## 2022-09-14 ENCOUNTER — TELEPHONE (OUTPATIENT)
Dept: CARDIOLOGY | Facility: CLINIC | Age: 62
End: 2022-09-14

## 2022-09-14 NOTE — TELEPHONE ENCOUNTER
Lets try increasing the Coreg to 12.5 mg twice daily.  If that does not help with her palpitations, then we will place a heart monitor.

## 2022-09-14 NOTE — TELEPHONE ENCOUNTER
Patient came by office stating that she has been feeling like her heart is fluttering, happened for about 2 hours today. Patient states that it has since eased off. She states that she thinks it may be anxiety related, has been under a lot of stress recently. Patient states that her HR has been between 72-81, but states that normally her HR is in the 50s and 60s. Patient states that when she took BP earlier it was 170/74. Patient states that she had taken medications today.     Patient was made aware that if that feeling came back or if she developed chest pain to go to ER. Patient verbalized understanding.       Do you have any recommendations?    Patient is on:  · Coreg 12.5 mg in the morning and 6.25 mg in the evening  · Triamterene-HCTZ 75-50 mg daily  · Enalapril 10 mg 2 tablets in the morning and 1 tablet in the evening

## 2022-09-15 NOTE — TELEPHONE ENCOUNTER
Patient was made aware to increase coreg to 12.5 mg in the morning and the evening.  Patient states that she has actually been taking coreg 6.25 mg BID and not taking the extra pill in the afternoon like she was supposed to be taking. Patient was made aware that she can either take 2 tablets of the 6.25 mg or take coreg 6.25 mg TID. Patient was made aware that if palpitations continue to call back for order for holter monitor.

## 2022-09-19 ENCOUNTER — TELEPHONE (OUTPATIENT)
Dept: CARDIOLOGY | Facility: CLINIC | Age: 62
End: 2022-09-19

## 2022-09-19 NOTE — TELEPHONE ENCOUNTER
Patient called reporting fluttering has been better since taking Coreg 6.25 mg TID. She is asking if she could possibly come in for EKG, she has concerns with heart?

## 2022-09-20 ENCOUNTER — CLINICAL SUPPORT (OUTPATIENT)
Dept: CARDIOLOGY | Facility: CLINIC | Age: 62
End: 2022-09-20

## 2022-09-20 DIAGNOSIS — I49.3 PVC (PREMATURE VENTRICULAR CONTRACTION): ICD-10-CM

## 2022-09-20 DIAGNOSIS — I49.1 PREMATURE ATRIAL CONTRACTIONS: ICD-10-CM

## 2022-09-20 DIAGNOSIS — R00.2 PALPITATIONS: Primary | ICD-10-CM

## 2022-09-20 PROCEDURE — 93000 ELECTROCARDIOGRAM COMPLETE: CPT | Performed by: NURSE PRACTITIONER

## 2022-09-22 ENCOUNTER — TELEPHONE (OUTPATIENT)
Dept: CARDIOLOGY | Facility: CLINIC | Age: 62
End: 2022-09-22

## 2022-11-07 ENCOUNTER — OFFICE VISIT (OUTPATIENT)
Dept: CARDIOLOGY | Facility: CLINIC | Age: 62
End: 2022-11-07

## 2022-11-07 VITALS
HEIGHT: 66 IN | HEART RATE: 72 BPM | DIASTOLIC BLOOD PRESSURE: 70 MMHG | SYSTOLIC BLOOD PRESSURE: 130 MMHG | BODY MASS INDEX: 40.18 KG/M2 | WEIGHT: 250 LBS

## 2022-11-07 DIAGNOSIS — I49.3 PVC (PREMATURE VENTRICULAR CONTRACTION): ICD-10-CM

## 2022-11-07 DIAGNOSIS — R00.2 PALPITATIONS: Primary | ICD-10-CM

## 2022-11-07 DIAGNOSIS — F41.9 ANXIETY: ICD-10-CM

## 2022-11-07 DIAGNOSIS — E66.01 MORBID OBESITY WITH BMI OF 40.0-44.9, ADULT: ICD-10-CM

## 2022-11-07 DIAGNOSIS — I49.1 PREMATURE ATRIAL CONTRACTIONS: ICD-10-CM

## 2022-11-07 DIAGNOSIS — I10 PRIMARY HYPERTENSION: ICD-10-CM

## 2022-11-07 PROCEDURE — 99213 OFFICE O/P EST LOW 20 MIN: CPT | Performed by: NURSE PRACTITIONER

## 2022-11-07 RX ORDER — BUPROPION HYDROCHLORIDE 150 MG/1
150 TABLET, EXTENDED RELEASE ORAL DAILY
COMMUNITY

## 2022-11-07 NOTE — PROGRESS NOTES
Chief Complaint   Patient presents with   • Follow-up     Cardiac management   • Lab     Last labs 10/17/22 at [facility].   • Palpitations     Doing better, has been drinking more water and decrease soda to once daily.   • Med Refill     Does not need refills at this time.     Subjective       Brianna Escalera is a 62 y.o. female with HTN, hypercholesterolemia and palpitations who also has significant anxiety.  She was referred back in October 2021 after not being seen for about 6 years.  Stress test in 2015 was inconclusive.  Lopressor changed to Coreg for better BP and HR control.  When she returned, she was still taking carvedilol but admitted to increasing palpitations and fluttering.  She associated much of her symptoms with GI fullness triggering the fluttering.  Echo and Holter showed normal LV function, average HR 63 with 6% PACs and 1% PVC.  We tried diltiazem but did not tolerate.  She wanted to go back on carvedilol. Labs 5/25/2021 showed GFR 71, normal sodium and potassium.    Brianna returns today for regular follow-up.  Palpitations improved significantly.  Last visit, we tried increasing dose of Coreg but she felt better with 6.25 mg twice daily.  She has decreased caffeine intake.  Drinking more water.  She remains physically active, watches her diet.  She is now taking care of her nephew who recently had an amputation.  Does not need refills.  Labs last month per PCP reported to stable.       Cardiac History:    Past Surgical History:   Procedure Laterality Date   • CARDIOVASCULAR STRESS TEST  11/17/2015    R.Stress- 5 Min. 74% THR. BP- 156/80. Inconclusive   • ECHO - CONVERTED  12/08/2021    TLS.EF 60%. Trace -Mild MR. RVSP- 21 mmHg   • OTHER SURGICAL HISTORY  12/04/2021    Event monitor- 1 day. Avg 63. . 6% PAC. 1 % PVC     Current Outpatient Medications   Medication Sig Dispense Refill   • buPROPion SR (WELLBUTRIN SR) 150 MG 12 hr tablet Take 1 tablet by mouth Daily.     •  carvedilol (COREG) 6.25 MG tablet Take 2 tablets in am, 1 tablet in pm (Patient taking differently: Take 1 tablet by mouth 2 (Two) Times a Day With Meals.) 270 tablet 3   • cholecalciferol (VITAMIN D3) 25 MCG (1000 UT) tablet Take 1,000 Units by mouth Daily.     • dicyclomine (Bentyl) 10 MG capsule Take 1 capsule by mouth 2 (Two) Times a Day. Before meals 60 capsule 8   • docusate sodium (COLACE) 250 MG capsule Take 250 mg by mouth Daily.     • enalapril (VASOTEC) 10 MG tablet 2 tabs in AM, 1 tab in PM     • furosemide (LASIX) 20 MG tablet Takes 1/2 tablet PRN     • HYDROcodone-acetaminophen (NORCO)  MG per tablet Take 1 tablet by mouth 3 (three) times a day as needed for moderate pain (4-6).     • loratadine (CLARITIN) 10 MG tablet Take 10 mg by mouth Daily.     • Omega-3 Fatty Acids (fish oil) 1000 MG capsule capsule Take 1,000 mg by mouth 2 (Two) Times a Day With Meals.     • pantoprazole (PROTONIX) 40 MG EC tablet Take 40 mg by mouth Daily.     • triamterene-hydrochlorothiazide (MAXZIDE) 75-50 MG per tablet Take 1 tablet by mouth daily.       No current facility-administered medications for this visit.     Azithromycin, Bactrim [sulfamethoxazole-trimethoprim], Cephalexin, Diltiazem, Norvasc [amlodipine], Other, Penicillins, Sulfa antibiotics, and Trimethoprim    Past Medical History:   Diagnosis Date   • Anxiety     panic attacks   • Cancer (HCC)     uterine   • Chronic back pain    • Depression    • Diabetes mellitus (HCC)    • GERD (gastroesophageal reflux disease)    • GERD (gastroesophageal reflux disease)    • H/O hernia repair    • H/O: hysterectomy    • Heart murmur    • History of cholecystectomy    • Hx of appendectomy    • Hypertension      Social History     Socioeconomic History   • Marital status:    Tobacco Use   • Smoking status: Former     Packs/day: 1.00     Years: 20.00     Pack years: 20.00     Types: Cigarettes     Quit date:      Years since quittin.8   • Smokeless  "tobacco: Never   • Tobacco comments:     Quit 2006   Vaping Use   • Vaping Use: Never used   Substance and Sexual Activity   • Alcohol use: No   • Drug use: Never   • Sexual activity: Defer     Family History   Problem Relation Age of Onset   • Heart attack Mother         multiple. First in 40's   • Hypertension Mother    • Diabetes Mother    • Cancer Mother    • Heart disease Mother    • No Known Problems Father    • Heart disease Other         palpatations   • Hypertension Sister    • Heart attack Sister    • Kidney disease Sister      Review of Systems   Constitutional: Positive for weight gain (+3 pounds). Negative for decreased appetite and malaise/fatigue.   HENT: Negative.    Eyes: Negative for blurred vision.   Cardiovascular: Negative for chest pain, dyspnea on exertion, leg swelling, palpitations (Improved) and syncope.   Respiratory: Negative for shortness of breath and sleep disturbances due to breathing.    Endocrine: Negative.    Hematologic/Lymphatic: Negative for bleeding problem. Does not bruise/bleed easily.   Skin: Negative.    Musculoskeletal: Negative for falls and myalgias.   Gastrointestinal: Negative for abdominal pain, heartburn and melena.   Genitourinary: Negative for hematuria.   Neurological: Negative for dizziness and light-headedness.   Psychiatric/Behavioral: Negative for altered mental status.   Allergic/Immunologic: Negative.       Objective     /70 (BP Location: Right arm)   Pulse 72   Ht 167 cm (65.75\")   Wt 113 kg (250 lb)   BMI 40.66 kg/m²     Vitals and nursing note reviewed.   Constitutional:       General: Not in acute distress.     Appearance: Well-developed. Not diaphoretic.   Eyes:      Pupils: Pupils are equal, round, and reactive to light.   HENT:      Head: Normocephalic.   Pulmonary:      Effort: Pulmonary effort is normal. No respiratory distress.      Breath sounds: Normal breath sounds.   Cardiovascular:      Normal rate. Regular rhythm.      Murmurs: " There is a grade 1/6 systolic murmur.   Pulses:     Intact distal pulses.   Edema:     Peripheral edema absent.   Abdominal:      General: Bowel sounds are normal.      Palpations: Abdomen is soft.   Musculoskeletal: Normal range of motion.      Cervical back: Normal range of motion. Skin:     General: Skin is warm and dry.   Neurological:      Mental Status: Alert and oriented to person, place, and time.        Procedures          Problem List Items Addressed This Visit        Cardiac and Vasculature    Primary hypertension    Palpitations - Primary    Premature atrial contractions    PVC (premature ventricular contraction)       Endocrine and Metabolic    Morbid obesity with BMI of 40.0-44.9, adult ([facility])       Mental Health    Anxiety      1.  Palpitations/PAC/PVC- symptoms well controlled with Coreg.  Continue same dose, 6.25 twice daily.      2.  HTN- well-controlled at 130/70, continue carvedilol, enalapril, triamterene/HCTZ.  Low-sodium diet.  Continue weight loss efforts.    3.  Hyperlipidemia- continue fish oil, labs followed by PCP.    No changes made today.  No refills needed.  Follow-up in 6 months or sooner if needed.  Class 3 Severe Obesity (BMI >=40). Obesity-related health conditions include the following: obstructive sleep apnea, hypertension, coronary heart disease, diabetes mellitus, dyslipidemias and GERD. Obesity is unchanged. BMI is is above average; BMI management plan is completed. We discussed portion control and increasing exercise.     She is maintained smoking cessation for 16 years.              Electronically signed by MONIQUE Orona,  November 9, 2022 09:31 EST

## 2023-01-12 ENCOUNTER — TELEPHONE (OUTPATIENT)
Dept: CARDIOLOGY | Facility: CLINIC | Age: 63
End: 2023-01-12
Payer: COMMERCIAL

## 2023-01-12 ENCOUNTER — CLINICAL SUPPORT (OUTPATIENT)
Dept: CARDIOLOGY | Facility: CLINIC | Age: 63
End: 2023-01-12
Payer: COMMERCIAL

## 2023-01-12 DIAGNOSIS — R00.2 PALPITATIONS: Primary | ICD-10-CM

## 2023-01-12 PROCEDURE — 93000 ELECTROCARDIOGRAM COMPLETE: CPT | Performed by: NURSE PRACTITIONER

## 2023-01-12 NOTE — TELEPHONE ENCOUNTER
"Patient called office reporting racing heart rate. States \"my heart will race and pound then feels like it just stops. It makes me nervous and shaky. If heart rate gets over 60s-70, my heart seems to feel like it is racing\".    Patient here for EKG.  "

## 2023-01-12 NOTE — TELEPHONE ENCOUNTER
Patient made aware to add back coreg 6.25 mg 2 tablets in am and 1 tablet in evening, monitor B/P, if B/P gets to low need to call office back can decrease morning dose of vasotec. Patient verbalized understanding.

## 2023-01-12 NOTE — TELEPHONE ENCOUNTER
Patient left message requesting call back. Called patient back, unable to reach, and unable to leave message due to no voice mail.

## 2023-01-12 NOTE — PROGRESS NOTES
Procedure     ECG 12 Lead    Date/Time: 1/12/2023 2:36 PM  Performed by: Sherry Rayo APRN  Authorized by: Sherry Rayo APRN   Comparison: compared with previous ECG from 9/20/2022  Similar to previous ECG  Comparison to previous ECG: No PACs prior  Rhythm: sinus rhythm  Ectopy: atrial premature contractions  BPM: 65    Clinical impression: abnormal EKG  Comments: Normal QT

## 2023-02-13 ENCOUNTER — TELEPHONE (OUTPATIENT)
Dept: CARDIOLOGY | Facility: CLINIC | Age: 63
End: 2023-02-13
Payer: COMMERCIAL

## 2023-02-13 NOTE — TELEPHONE ENCOUNTER
Spoke with patient concerning palpitations. Patient reports she has not been taking coreg 2 tablets in morning, she has been taking 1 1/2 tablets in morning and 1 tablet in the evening. She is asking if ok to take coreg 1 1/2 tablet BID. She will be bring nephew to appointment on 3/8/23.

## 2023-05-15 ENCOUNTER — OFFICE VISIT (OUTPATIENT)
Dept: CARDIOLOGY | Facility: CLINIC | Age: 63
End: 2023-05-15
Payer: COMMERCIAL

## 2023-05-15 VITALS
HEART RATE: 76 BPM | WEIGHT: 249.4 LBS | DIASTOLIC BLOOD PRESSURE: 80 MMHG | SYSTOLIC BLOOD PRESSURE: 140 MMHG | HEIGHT: 66 IN | BODY MASS INDEX: 40.08 KG/M2

## 2023-05-15 DIAGNOSIS — R06.00 DYSPNEA, UNSPECIFIED TYPE: ICD-10-CM

## 2023-05-15 DIAGNOSIS — I49.3 PVC (PREMATURE VENTRICULAR CONTRACTION): ICD-10-CM

## 2023-05-15 DIAGNOSIS — E78.00 HYPERCHOLESTEROLEMIA: ICD-10-CM

## 2023-05-15 DIAGNOSIS — I49.1 PREMATURE ATRIAL CONTRACTIONS: ICD-10-CM

## 2023-05-15 DIAGNOSIS — I10 PRIMARY HYPERTENSION: ICD-10-CM

## 2023-05-15 DIAGNOSIS — E66.01 MORBID OBESITY WITH BMI OF 40.0-44.9, ADULT: Primary | ICD-10-CM

## 2023-05-15 PROCEDURE — 3077F SYST BP >= 140 MM HG: CPT | Performed by: NURSE PRACTITIONER

## 2023-05-15 PROCEDURE — 1160F RVW MEDS BY RX/DR IN RCRD: CPT | Performed by: NURSE PRACTITIONER

## 2023-05-15 PROCEDURE — 99214 OFFICE O/P EST MOD 30 MIN: CPT | Performed by: NURSE PRACTITIONER

## 2023-05-15 PROCEDURE — 3079F DIAST BP 80-89 MM HG: CPT | Performed by: NURSE PRACTITIONER

## 2023-05-15 PROCEDURE — 1159F MED LIST DOCD IN RCRD: CPT | Performed by: NURSE PRACTITIONER

## 2023-05-15 RX ORDER — MONTELUKAST SODIUM 10 MG/1
TABLET ORAL
COMMUNITY
Start: 2023-04-26

## 2023-05-15 RX ORDER — FUROSEMIDE 20 MG/1
20 TABLET ORAL DAILY
Qty: 90 TABLET | Refills: 3 | Status: SHIPPED | OUTPATIENT
Start: 2023-05-15

## 2023-05-15 RX ORDER — LINACLOTIDE 145 UG/1
CAPSULE, GELATIN COATED ORAL
COMMUNITY
Start: 2023-04-26

## 2023-05-15 RX ORDER — TRIAMTERENE AND HYDROCHLOROTHIAZIDE 75; 50 MG/1; MG/1
1 TABLET ORAL DAILY
Qty: 90 TABLET | Refills: 3 | Status: SHIPPED | OUTPATIENT
Start: 2023-05-15

## 2023-05-15 RX ORDER — CARVEDILOL 6.25 MG/1
TABLET ORAL
Qty: 270 TABLET | Refills: 3 | Status: SHIPPED | OUTPATIENT
Start: 2023-05-15

## 2023-05-15 RX ORDER — FLUTICASONE PROPIONATE 50 MCG
SPRAY, SUSPENSION (ML) NASAL
COMMUNITY
Start: 2023-04-26

## 2023-05-15 RX ORDER — DOCUSATE SODIUM 100 MG/1
CAPSULE, LIQUID FILLED ORAL
COMMUNITY
Start: 2023-04-26

## 2023-05-15 RX ORDER — POTASSIUM CHLORIDE 20 MEQ/1
TABLET, EXTENDED RELEASE ORAL
COMMUNITY
Start: 2023-04-27

## 2023-05-15 RX ORDER — OXYMETAZOLINE HYDROCHLORIDE 5 G/100ML
SPRAY NASAL
COMMUNITY
Start: 2023-04-27

## 2023-05-15 RX ORDER — BUPROPION HYDROCHLORIDE 150 MG/1
TABLET ORAL
COMMUNITY
Start: 2023-04-26

## 2023-05-15 RX ORDER — ENALAPRIL MALEATE 10 MG/1
10 TABLET ORAL 2 TIMES DAILY
Qty: 270 TABLET | Refills: 3 | Status: SHIPPED | OUTPATIENT
Start: 2023-05-15

## 2023-05-15 RX ORDER — VENLAFAXINE 37.5 MG/1
TABLET ORAL
COMMUNITY
Start: 2023-04-27

## 2023-05-15 NOTE — LETTER
May 17, 2023       No Recipients    Patient: Brianna Escalera   YOB: 1960   Date of Visit: 5/15/2023       Dear MONIQUE Moreno    Brianna Escalera was in my office today. Below is a copy of my note.    If you have questions, please do not hesitate to call me. I look forward to following Brianna along with you.         Sincerely,        MONIQUE Orona        CC:   No Recipients    Chief Complaint   Patient presents with   • Follow-up     Pt is here for cardiac follow up. Pt has SOA, happens when weather and anxiety. Pt denies having any palpitations, chest pain, and dizziness.      • Med Refill     Pt does need refills. 90 day refills preferred, but will do 30 if insurance can't. [facility],    • Labs Only     Last labs are from year ago, attempted to contact PCP but was unable to reach, left VM.      Subjective       Brianna Escalera is a 63 y.o. female with HTN, hypercholesterolemia and palpitations who also has significant anxiety.  She was referred back in October 2021 after not being seen for about 6 years.  Stress test in 2015 was inconclusive.  Lopressor changed to Coreg for better BP and HR control.  When she returned, she was still taking carvedilol but admitted to increasing palpitations and fluttering.  She associated much of her symptoms with GI fullness triggering the fluttering.  Echo and Holter showed normal LV function, average HR 63 with 6% PACs and 1% PVC.  We tried diltiazem but did not tolerate.  She wanted to go back on carvedilol. Labs 5/25/2021 showed GFR 71, normal sodium and potassium.    She returns today for follow up. Chief complaint is shortness of breath with mild to moderate exertion. SOB improves with rest. No chest pain but does note heaviness across chest when she is out of breath. Palpitations occur intermittently when she is anxious. She is taking Coreg 12.5 mg in am and 6.25 mg in pm. BP remains well controlled. No labs available.         Cardiac  History:    Past Surgical History:   Procedure Laterality Date   • CARDIOVASCULAR STRESS TEST  11/17/2015    R.Stress- 5 Min. 74% THR. BP- 156/80. Inconclusive   • ECHO - CONVERTED  12/08/2021    TLS.EF 60%. Trace -Mild MR. RVSP- 21 mmHg   • OTHER SURGICAL HISTORY  12/04/2021    Event monitor- 1 day. Avg 63. . 6% PAC. 1 % PVC     Current Outpatient Medications   Medication Sig Dispense Refill   • 12 Hour Nasal Spray 0.05 % nasal spray      • buPROPion XL (WELLBUTRIN XL) 150 MG 24 hr tablet      • carvedilol (COREG) 6.25 MG tablet Take 2 tablets in am, 1 tablet in pm 270 tablet 3   • Cholecalciferol (Vitamin D3) 25 MCG (1000 UT) capsule      • docusate sodium (COLACE) 100 MG capsule      • docusate sodium (COLACE) 250 MG capsule Take 1 capsule by mouth Daily.     • enalapril (VASOTEC) 10 MG tablet Take 1 tablet by mouth 2 (Two) Times a Day. 2 tabs in AM, 1 tab in  tablet 3   • fluticasone (FLONASE) 50 MCG/ACT nasal spray      • furosemide (LASIX) 20 MG tablet Take 1 tablet by mouth Daily. Takes 1/2 tablet PRN 90 tablet 3   • HYDROcodone-acetaminophen (NORCO)  MG per tablet Take 1 tablet by mouth 3 (Three) Times a Day As Needed for Moderate Pain.     • Linzess 145 MCG capsule capsule      • loratadine (CLARITIN) 10 MG tablet Take 1 tablet by mouth Daily.     • montelukast (SINGULAIR) 10 MG tablet      • Omega-3 Fatty Acids (fish oil) 1000 MG capsule capsule Take 1 capsule by mouth 2 (Two) Times a Day With Meals.     • pantoprazole (PROTONIX) 40 MG EC tablet Take 1 tablet by mouth Daily.     • potassium chloride (K-DUR,KLOR-CON) 20 MEQ CR tablet      • sertraline (ZOLOFT) 50 MG tablet      • triamterene-hydrochlorothiazide (MAXZIDE) 75-50 MG per tablet Take 1 tablet by mouth Daily. 90 tablet 3   • venlafaxine (EFFEXOR) 37.5 MG tablet        No current facility-administered medications for this visit.       Amlodipine, Azithromycin, Bactrim [sulfamethoxazole-trimethoprim], Cephalexin, Diltiazem, Other,  Penicillins, Sulfa antibiotics, and Trimethoprim    Past Medical History:   Diagnosis Date   • Anxiety     panic attacks   • Cancer     uterine   • Chronic back pain    • Depression    • Diabetes mellitus    • GERD (gastroesophageal reflux disease)    • GERD (gastroesophageal reflux disease)    • H/O hernia repair    • H/O: hysterectomy    • Heart murmur    • History of cholecystectomy    • Hx of appendectomy    • Hypertension        Social History     Socioeconomic History   • Marital status:    Tobacco Use   • Smoking status: Former     Packs/day: 1.00     Years: 20.00     Pack years: 20.00     Types: Cigarettes     Quit date:      Years since quittin.3   • Smokeless tobacco: Never   • Tobacco comments:     Quit    Vaping Use   • Vaping Use: Never used   Substance and Sexual Activity   • Alcohol use: No   • Drug use: Never   • Sexual activity: Defer       Family History   Problem Relation Age of Onset   • Heart attack Mother         multiple. First in 40's   • Hypertension Mother    • Diabetes Mother    • Cancer Mother    • Heart disease Mother    • No Known Problems Father    • Heart disease Other         palpatations   • Hypertension Sister    • Heart attack Sister    • Kidney disease Sister        Review of Systems   Constitutional: Positive for malaise/fatigue and weight loss (-1). Negative for decreased appetite.   HENT: Negative.    Eyes: Negative for blurred vision.   Cardiovascular: Positive for chest pain (heaviness ), dyspnea on exertion and palpitations. Negative for leg swelling and syncope.   Respiratory: Positive for shortness of breath. Negative for sleep disturbances due to breathing.    Endocrine: Negative.    Hematologic/Lymphatic: Negative for bleeding problem. Does not bruise/bleed easily.   Skin: Negative.    Musculoskeletal: Negative for falls and myalgias.   Gastrointestinal: Positive for bloating, flatus and heartburn. Negative for abdominal pain and melena.  "  Genitourinary: Negative for hematuria.   Neurological: Negative for dizziness and light-headedness.   Psychiatric/Behavioral: Negative for altered mental status. The patient is nervous/anxious.    Allergic/Immunologic: Negative.       Objective      /80 (BP Location: Left arm, Patient Position: Sitting, Cuff Size: Adult)   Pulse 76   Ht 167 cm (65.75\")   Wt 113 kg (249 lb 6.4 oz)   BMI 40.56 kg/m²     Vitals and nursing note reviewed.   Constitutional:       General: Not in acute distress.     Appearance: Well-developed. Not diaphoretic.   Eyes:      Pupils: Pupils are equal, round, and reactive to light.   HENT:      Head: Normocephalic.   Pulmonary:      Effort: Pulmonary effort is normal. No respiratory distress.      Breath sounds: Normal breath sounds.   Cardiovascular:      Normal rate. Regular rhythm.   Pulses:     Intact distal pulses.   Abdominal:      General: Bowel sounds are normal.      Palpations: Abdomen is soft.   Musculoskeletal: Normal range of motion.      Cervical back: Normal range of motion. Skin:     General: Skin is warm and dry.   Neurological:      Mental Status: Alert and oriented to person, place, and time.          Procedures         Problem List Items Addressed This Visit          Cardiac and Vasculature    Primary hypertension    Relevant Medications    enalapril (VASOTEC) 10 MG tablet    carvedilol (COREG) 6.25 MG tablet    triamterene-hydrochlorothiazide (MAXZIDE) 75-50 MG per tablet    furosemide (LASIX) 20 MG tablet    Other Relevant Orders    Stress Test With Myocardial Perfusion One Day    Adult Transthoracic Echo Complete W/ Cont if Necessary Per Protocol    Lipid Panel    Magnesium    TSH    CBC (No Diff)    Comprehensive Metabolic Panel    Premature atrial contractions    Relevant Medications    carvedilol (COREG) 6.25 MG tablet    Other Relevant Orders    Stress Test With Myocardial Perfusion One Day    Adult Transthoracic Echo Complete W/ Cont if Necessary Per " Protocol    Lipid Panel    Magnesium    TSH    CBC (No Diff)    Comprehensive Metabolic Panel    PVC (premature ventricular contraction)    Relevant Medications    carvedilol (COREG) 6.25 MG tablet    Other Relevant Orders    Stress Test With Myocardial Perfusion One Day    Adult Transthoracic Echo Complete W/ Cont if Necessary Per Protocol    Lipid Panel    Magnesium    TSH    CBC (No Diff)    Comprehensive Metabolic Panel       Endocrine and Metabolic    Morbid obesity with BMI of 40.0-44.9, adult - Primary    Relevant Orders    Lipid Panel    Magnesium    TSH    Comprehensive Metabolic Panel     Other Visit Diagnoses       Dyspnea, unspecified type        Relevant Orders    Stress Test With Myocardial Perfusion One Day    Adult Transthoracic Echo Complete W/ Cont if Necessary Per Protocol    Lipid Panel    Magnesium    TSH    CBC (No Diff)    Comprehensive Metabolic Panel    Hypercholesterolemia        Relevant Orders    Lipid Panel           1.  HTN-upper limit of normal.  No changes made.  Continue Coreg 12.5 in a.m., 6.25 in p.m., enalapril, Lasix as needed, Dyazide.  Low-sodium diet.  Weight loss encouraged.    2.  Palpitations-Holter monitor showed 6% PAC, 1% PVC, palpitations improved with carvedilol.  Continue the same.  We will recheck labs, TSH and mag, when she presents for testing.    3.  Hyperlipidemia- she is on fish oil, labs been followed by PCP but patient states no labs in quite a while.  We will check fasting lipid, CMP.    4.  Dyspnea on exertion-questionable anginal equivalent, she agrees to repeat stress test and echocardiogram to evaluate exercise tolerance, LVEF, rule out ischemia.  Her last stress test in 2015 was inconclusive.    Further recommendations to follow echo, stress and labs.    Follow-up 6 months or sooner if needed.  No changes made to her medications today.    Class 3 Severe Obesity (BMI >=40). Obesity-related health conditions include the following: obstructive sleep apnea,  hypertension, coronary heart disease, diabetes mellitus and dyslipidemias. Obesity is improving with lifestyle modifications. BMI is is above average; BMI management plan is completed. We discussed portion control and increasing exercise.              Electronically signed by MONIQUE Orona,  May 17, 2023 09:31 EDT

## 2023-05-15 NOTE — PROGRESS NOTES
Chief Complaint   Patient presents with   • Follow-up     Pt is here for cardiac follow up. Pt has SOA, happens when weather and anxiety. Pt denies having any palpitations, chest pain, and dizziness.      • Med Refill     Pt does need refills. 90 day refills preferred, but will do 30 if insurance can't. [employer],    • Labs Only     Last labs are from year ago, attempted to contact PCP but was unable to reach, left VM.      Subjective       Brianna Escalera is a 63 y.o. female with HTN, hypercholesterolemia and palpitations who also has significant anxiety.  She was referred back in October 2021 after not being seen for about 6 years.  Stress test in 2015 was inconclusive.  Lopressor changed to Coreg for better BP and HR control.  When she returned, she was still taking carvedilol but admitted to increasing palpitations and fluttering.  She associated much of her symptoms with GI fullness triggering the fluttering.  Echo and Holter showed normal LV function, average HR 63 with 6% PACs and 1% PVC.  We tried diltiazem but did not tolerate.  She wanted to go back on carvedilol. Labs 5/25/2021 showed GFR 71, normal sodium and potassium.    She returns today for follow up. Chief complaint is shortness of breath with mild to moderate exertion. SOB improves with rest. No chest pain but does note heaviness across chest when she is out of breath. Palpitations occur intermittently when she is anxious. She is taking Coreg 12.5 mg in am and 6.25 mg in pm. BP remains well controlled. No labs available.          Cardiac History:    Past Surgical History:   Procedure Laterality Date   • CARDIOVASCULAR STRESS TEST  11/17/2015    R.Stress- 5 Min. 74% THR. BP- 156/80. Inconclusive   • ECHO - CONVERTED  12/08/2021    TLS.EF 60%. Trace -Mild MR. RVSP- 21 mmHg   • OTHER SURGICAL HISTORY  12/04/2021    Event monitor- 1 day. Avg 63. . 6% PAC. 1 % PVC     Current Outpatient Medications   Medication Sig Dispense Refill   • 12  Hour Nasal Spray 0.05 % nasal spray      • buPROPion XL (WELLBUTRIN XL) 150 MG 24 hr tablet      • carvedilol (COREG) 6.25 MG tablet Take 2 tablets in am, 1 tablet in pm 270 tablet 3   • Cholecalciferol (Vitamin D3) 25 MCG (1000 UT) capsule      • docusate sodium (COLACE) 100 MG capsule      • docusate sodium (COLACE) 250 MG capsule Take 1 capsule by mouth Daily.     • enalapril (VASOTEC) 10 MG tablet Take 1 tablet by mouth 2 (Two) Times a Day. 2 tabs in AM, 1 tab in  tablet 3   • fluticasone (FLONASE) 50 MCG/ACT nasal spray      • furosemide (LASIX) 20 MG tablet Take 1 tablet by mouth Daily. Takes 1/2 tablet PRN 90 tablet 3   • HYDROcodone-acetaminophen (NORCO)  MG per tablet Take 1 tablet by mouth 3 (Three) Times a Day As Needed for Moderate Pain.     • Linzess 145 MCG capsule capsule      • loratadine (CLARITIN) 10 MG tablet Take 1 tablet by mouth Daily.     • montelukast (SINGULAIR) 10 MG tablet      • Omega-3 Fatty Acids (fish oil) 1000 MG capsule capsule Take 1 capsule by mouth 2 (Two) Times a Day With Meals.     • pantoprazole (PROTONIX) 40 MG EC tablet Take 1 tablet by mouth Daily.     • potassium chloride (K-DUR,KLOR-CON) 20 MEQ CR tablet      • sertraline (ZOLOFT) 50 MG tablet      • triamterene-hydrochlorothiazide (MAXZIDE) 75-50 MG per tablet Take 1 tablet by mouth Daily. 90 tablet 3   • venlafaxine (EFFEXOR) 37.5 MG tablet        No current facility-administered medications for this visit.       Amlodipine, Azithromycin, Bactrim [sulfamethoxazole-trimethoprim], Cephalexin, Diltiazem, Other, Penicillins, Sulfa antibiotics, and Trimethoprim    Past Medical History:   Diagnosis Date   • Anxiety     panic attacks   • Cancer     uterine   • Chronic back pain    • Depression    • Diabetes mellitus    • GERD (gastroesophageal reflux disease)    • GERD (gastroesophageal reflux disease)    • H/O hernia repair    • H/O: hysterectomy    • Heart murmur    • History of cholecystectomy    • Hx of  "appendectomy    • Hypertension        Social History     Socioeconomic History   • Marital status:    Tobacco Use   • Smoking status: Former     Packs/day: 1.00     Years: 20.00     Pack years: 20.00     Types: Cigarettes     Quit date:      Years since quittin.3   • Smokeless tobacco: Never   • Tobacco comments:     Quit 2006   Vaping Use   • Vaping Use: Never used   Substance and Sexual Activity   • Alcohol use: No   • Drug use: Never   • Sexual activity: Defer       Family History   Problem Relation Age of Onset   • Heart attack Mother         multiple. First in 40's   • Hypertension Mother    • Diabetes Mother    • Cancer Mother    • Heart disease Mother    • No Known Problems Father    • Heart disease Other         palpatations   • Hypertension Sister    • Heart attack Sister    • Kidney disease Sister        Review of Systems   Constitutional: Positive for malaise/fatigue and weight loss (-1). Negative for decreased appetite.   HENT: Negative.    Eyes: Negative for blurred vision.   Cardiovascular: Positive for chest pain (heaviness ), dyspnea on exertion and palpitations. Negative for leg swelling and syncope.   Respiratory: Positive for shortness of breath. Negative for sleep disturbances due to breathing.    Endocrine: Negative.    Hematologic/Lymphatic: Negative for bleeding problem. Does not bruise/bleed easily.   Skin: Negative.    Musculoskeletal: Negative for falls and myalgias.   Gastrointestinal: Positive for bloating, flatus and heartburn. Negative for abdominal pain and melena.   Genitourinary: Negative for hematuria.   Neurological: Negative for dizziness and light-headedness.   Psychiatric/Behavioral: Negative for altered mental status. The patient is nervous/anxious.    Allergic/Immunologic: Negative.       Objective     /80 (BP Location: Left arm, Patient Position: Sitting, Cuff Size: Adult)   Pulse 76   Ht 167 cm (65.75\")   Wt 113 kg (249 lb 6.4 oz)   BMI 40.56 kg/m² "     Vitals and nursing note reviewed.   Constitutional:       General: Not in acute distress.     Appearance: Well-developed. Not diaphoretic.   Eyes:      Pupils: Pupils are equal, round, and reactive to light.   HENT:      Head: Normocephalic.   Pulmonary:      Effort: Pulmonary effort is normal. No respiratory distress.      Breath sounds: Normal breath sounds.   Cardiovascular:      Normal rate. Regular rhythm.   Pulses:     Intact distal pulses.   Abdominal:      General: Bowel sounds are normal.      Palpations: Abdomen is soft.   Musculoskeletal: Normal range of motion.      Cervical back: Normal range of motion. Skin:     General: Skin is warm and dry.   Neurological:      Mental Status: Alert and oriented to person, place, and time.          Procedures          Problem List Items Addressed This Visit        Cardiac and Vasculature    Primary hypertension    Relevant Medications    enalapril (VASOTEC) 10 MG tablet    carvedilol (COREG) 6.25 MG tablet    triamterene-hydrochlorothiazide (MAXZIDE) 75-50 MG per tablet    furosemide (LASIX) 20 MG tablet    Other Relevant Orders    Stress Test With Myocardial Perfusion One Day    Adult Transthoracic Echo Complete W/ Cont if Necessary Per Protocol    Lipid Panel    Magnesium    TSH    CBC (No Diff)    Comprehensive Metabolic Panel    Premature atrial contractions    Relevant Medications    carvedilol (COREG) 6.25 MG tablet    Other Relevant Orders    Stress Test With Myocardial Perfusion One Day    Adult Transthoracic Echo Complete W/ Cont if Necessary Per Protocol    Lipid Panel    Magnesium    TSH    CBC (No Diff)    Comprehensive Metabolic Panel    PVC (premature ventricular contraction)    Relevant Medications    carvedilol (COREG) 6.25 MG tablet    Other Relevant Orders    Stress Test With Myocardial Perfusion One Day    Adult Transthoracic Echo Complete W/ Cont if Necessary Per Protocol    Lipid Panel    Magnesium    TSH    CBC (No Diff)    Comprehensive  Metabolic Panel       Endocrine and Metabolic    Morbid obesity with BMI of 40.0-44.9, adult - Primary    Relevant Orders    Lipid Panel    Magnesium    TSH    Comprehensive Metabolic Panel   Other Visit Diagnoses     Dyspnea, unspecified type        Relevant Orders    Stress Test With Myocardial Perfusion One Day    Adult Transthoracic Echo Complete W/ Cont if Necessary Per Protocol    Lipid Panel    Magnesium    TSH    CBC (No Diff)    Comprehensive Metabolic Panel    Hypercholesterolemia        Relevant Orders    Lipid Panel         1.  HTN-upper limit of normal.  No changes made.  Continue Coreg 12.5 in a.m., 6.25 in p.m., enalapril, Lasix as needed, Dyazide.  Low-sodium diet.  Weight loss encouraged.    2.  Palpitations-Holter monitor showed 6% PAC, 1% PVC, palpitations improved with carvedilol.  Continue the same.  We will recheck labs, TSH and mag, when she presents for testing.    3.  Hyperlipidemia- she is on fish oil, labs been followed by PCP but patient states no labs in quite a while.  We will check fasting lipid, CMP.    4.  Dyspnea on exertion-questionable anginal equivalent, she agrees to repeat stress test and echocardiogram to evaluate exercise tolerance, LVEF, rule out ischemia.  Her last stress test in 2015 was inconclusive.    Further recommendations to follow echo, stress and labs.    Follow-up 6 months or sooner if needed.  No changes made to her medications today.    Class 3 Severe Obesity (BMI >=40). Obesity-related health conditions include the following: obstructive sleep apnea, hypertension, coronary heart disease, diabetes mellitus and dyslipidemias. Obesity is improving with lifestyle modifications. BMI is is above average; BMI management plan is completed. We discussed portion control and increasing exercise.               Electronically signed by MONIQUE Orona,  May 17, 2023 09:31 EDT

## 2023-05-24 ENCOUNTER — TELEPHONE (OUTPATIENT)
Dept: CARDIOLOGY | Facility: CLINIC | Age: 63
End: 2023-05-24
Payer: COMMERCIAL

## 2023-05-24 NOTE — TELEPHONE ENCOUNTER
Caller: Brianna Escalera    Relationship: Self    Best call back number: [phone]    What is the best time to reach you: ANYTIME    What was the call regarding: PT WAS CALLING IN TO ASK ABOUT WHAT TO DO ABOUT HER MEDICATION CONCERN THAT SHE CALLED IN ABOUT EARLIER, WOULD LIKE A CALL BACK ASAP     Do you require a callback: YES PLEASE

## 2023-05-24 NOTE — TELEPHONE ENCOUNTER
Spoke with patient concerning heart rate. Patient reports she has changed diet and has lost 11 pounds. Has noticed heart rate 47-60. She is asking if could lower dose of coreg. This am she only took coreg 6.25 mg one tablet. She had been taking coreg 6.25 mg two tablets in am and one tablet in pm.

## 2023-05-24 NOTE — TELEPHONE ENCOUNTER
Caller: Brianna Escalera    Relationship: Self    Best call back number: [phone]    What is the best time to reach you: ANY    Who are you requesting to speak with (clinical staff, provider,  specific staff member): CLINICAL    What was the call regarding: PT REPORTS SHE'D LIKE TERRA'S NURSE TO GIVE HER A CALL BACK IN REGARDS TO HER HR LOWERING WHEN TAKING CARVEDILOL. PT REPORTS HR AS LOW AS 59.    Do you require a callback: YES

## 2023-05-24 NOTE — TELEPHONE ENCOUNTER
"Patient came to office concerning medications. Patient made aware can take coreg 6.25 mg one tablet in morning and one tablet in evening as long as her palpitations are well controlled. Patient reports she has not had any palpitations, patient anxious about heart rate low at rest. Patient had been lifting heavy tools and twisted body today, noticed \"catch with pain\" in upper chest. Pain relieved with movement in arms and shoulders. Patient requesting B/P check, B/P 140/70 and HR 64 and regular. Advised patient if she had any palpations to call office back, understanding verbalized. Advised patient if she had any further musculoskeletal pain to contact PCP.   "

## 2023-05-24 NOTE — TELEPHONE ENCOUNTER
Good job on the weight loss.    Yes, as long as her palpitations are well controlled, she can take 1 tablet in the morning 1 tablet in the evening

## 2023-06-01 ENCOUNTER — TELEPHONE (OUTPATIENT)
Dept: CARDIOLOGY | Facility: CLINIC | Age: 63
End: 2023-06-01

## 2023-06-01 NOTE — TELEPHONE ENCOUNTER
Records reviewed.    EKG, labs and vital signs normal.  Heart rate was 66.    Agree with decreasing carvedilol to 3.125 mg twice daily.  Monitor heart rate.  If drops less than 50, then reduce to once daily.  She will be having stress test next week and we can see cardiac function.

## 2023-06-01 NOTE — TELEPHONE ENCOUNTER
Patient made aware ER records had been reviewed, agree with decreasing coreg to 3.125 mg bid. Monitor heart rate, if drops less than 50, then reduce to once daily. Understanding verbalized and aware of stress test is next week.

## 2023-06-01 NOTE — TELEPHONE ENCOUNTER
Patient left voice mail concerning coreg dose and HR still in the 40s. Patient went to [facility] 5/28/23 concerning low heart rate. Coreg was decreased to 3.125 mg bid. She reports heart rate continues in the 40s at rest.     Spoke with patient, she saw PCP today for low heart rate. PCP did suggest decreasing coreg to once daily or discontinuing medication.    Obtained ER note from [facility] and placed on your desk.

## 2023-06-06 ENCOUNTER — LAB (OUTPATIENT)
Dept: LAB | Facility: HOSPITAL | Age: 63
End: 2023-06-06
Payer: COMMERCIAL

## 2023-06-06 ENCOUNTER — HOSPITAL ENCOUNTER (OUTPATIENT)
Dept: CARDIOLOGY | Facility: HOSPITAL | Age: 63
End: 2023-06-06
Payer: COMMERCIAL

## 2023-06-06 ENCOUNTER — TELEPHONE (OUTPATIENT)
Dept: CARDIOLOGY | Facility: CLINIC | Age: 63
End: 2023-06-06
Payer: COMMERCIAL

## 2023-06-06 ENCOUNTER — HOSPITAL ENCOUNTER (OUTPATIENT)
Dept: CARDIOLOGY | Facility: HOSPITAL | Age: 63
Discharge: HOME | End: 2023-06-06
Payer: COMMERCIAL

## 2023-06-06 VITALS — HEIGHT: 66 IN | WEIGHT: 249.12 LBS | BODY MASS INDEX: 40.04 KG/M2

## 2023-06-06 DIAGNOSIS — I10 PRIMARY HYPERTENSION: ICD-10-CM

## 2023-06-06 DIAGNOSIS — I49.1 PREMATURE ATRIAL CONTRACTIONS: ICD-10-CM

## 2023-06-06 DIAGNOSIS — I49.3 PVC (PREMATURE VENTRICULAR CONTRACTION): ICD-10-CM

## 2023-06-06 DIAGNOSIS — R06.00 DYSPNEA, UNSPECIFIED TYPE: ICD-10-CM

## 2023-06-06 DIAGNOSIS — E66.01 MORBID OBESITY WITH BMI OF 40.0-44.9, ADULT: ICD-10-CM

## 2023-06-06 LAB
ALBUMIN SERPL-MCNC: 4.5 G/DL (ref 3.5–5.2)
ALBUMIN/GLOB SERPL: 1.5 G/DL
ALP SERPL-CCNC: 49 U/L (ref 39–117)
ALT SERPL W P-5'-P-CCNC: 19 U/L (ref 1–33)
ANION GAP SERPL CALCULATED.3IONS-SCNC: 14.6 MMOL/L (ref 5–15)
AST SERPL-CCNC: 14 U/L (ref 1–32)
BH CV ECHO MEAS - ACS: 1.87 CM
BH CV ECHO MEAS - AO ROOT DIAM: 3.3 CM
BH CV ECHO MEAS - EDV(CUBED): 111.8 ML
BH CV ECHO MEAS - EF(MOD-BP): 54 %
BH CV ECHO MEAS - ESV(CUBED): 43 ML
BH CV ECHO MEAS - FS: 27.3 %
BH CV ECHO MEAS - IVS/LVPW: 1.1 CM
BH CV ECHO MEAS - IVSD: 1.4 CM
BH CV ECHO MEAS - LA DIMENSION: 4.2 CM
BH CV ECHO MEAS - LAT PEAK E' VEL: 8.2 CM/SEC
BH CV ECHO MEAS - LV MASS(C)D: 257.5 GRAMS
BH CV ECHO MEAS - LV MAX PG: 6.5 MMHG
BH CV ECHO MEAS - LV MEAN PG: 2.7 MMHG
BH CV ECHO MEAS - LV V1 MAX: 127.2 CM/SEC
BH CV ECHO MEAS - LV V1 VTI: 33.1 CM
BH CV ECHO MEAS - LVIDD: 4.8 CM
BH CV ECHO MEAS - LVIDS: 3.5 CM
BH CV ECHO MEAS - LVPWD: 1.27 CM
BH CV ECHO MEAS - MED PEAK E' VEL: 6.3 CM/SEC
BH CV ECHO MEAS - MV A MAX VEL: 94.5 CM/SEC
BH CV ECHO MEAS - MV DEC SLOPE: 609.9 CM/SEC2
BH CV ECHO MEAS - MV DEC TIME: 0.21 MSEC
BH CV ECHO MEAS - MV E MAX VEL: 82 CM/SEC
BH CV ECHO MEAS - MV E/A: 0.87
BH CV ECHO MEAS - MV MAX PG: 6 MMHG
BH CV ECHO MEAS - MV MEAN PG: 1.82 MMHG
BH CV ECHO MEAS - MV P1/2T: 50.6 MSEC
BH CV ECHO MEAS - MV V2 VTI: 40.9 CM
BH CV ECHO MEAS - MVA(P1/2T): 4.4 CM2
BH CV ECHO MEAS - PA V2 MAX: 105.3 CM/SEC
BH CV ECHO MEAS - RAP SYSTOLE: 8 MMHG
BH CV ECHO MEAS - RV MAX PG: 3.1 MMHG
BH CV ECHO MEAS - RV V1 MAX: 88.4 CM/SEC
BH CV ECHO MEAS - RV V1 VTI: 19.5 CM
BH CV ECHO MEAS - RVSP: 29 MMHG
BH CV ECHO MEAS - TAPSE (>1.6): 2.48 CM
BH CV ECHO MEAS - TR MAX PG: 21 MMHG
BH CV ECHO MEAS - TR MAX VEL: 228.9 CM/SEC
BH CV ECHO MEASUREMENTS AVERAGE E/E' RATIO: 11.31
BH CV XLRA - TDI S': 16 CM/SEC
BILIRUB SERPL-MCNC: 0.5 MG/DL (ref 0–1.2)
BUN SERPL-MCNC: 8 MG/DL (ref 8–23)
BUN/CREAT SERPL: 11.8 (ref 7–25)
CALCIUM SPEC-SCNC: 10 MG/DL (ref 8.6–10.5)
CHLORIDE SERPL-SCNC: 94 MMOL/L (ref 98–107)
CO2 SERPL-SCNC: 26.4 MMOL/L (ref 22–29)
CREAT SERPL-MCNC: 0.68 MG/DL (ref 0.57–1)
DEPRECATED RDW RBC AUTO: 41.4 FL (ref 37–54)
EGFRCR SERPLBLD CKD-EPI 2021: 97.4 ML/MIN/1.73
ERYTHROCYTE [DISTWIDTH] IN BLOOD BY AUTOMATED COUNT: 13.2 % (ref 12.3–15.4)
GLOBULIN UR ELPH-MCNC: 3.1 GM/DL
GLUCOSE SERPL-MCNC: 147 MG/DL (ref 65–99)
HCT VFR BLD AUTO: 45 % (ref 34–46.6)
HGB BLD-MCNC: 14.6 G/DL (ref 12–15.9)
MAGNESIUM SERPL-MCNC: 1.7 MG/DL (ref 1.6–2.4)
MCH RBC QN AUTO: 28.1 PG (ref 26.6–33)
MCHC RBC AUTO-ENTMCNC: 32.4 G/DL (ref 31.5–35.7)
MCV RBC AUTO: 86.5 FL (ref 79–97)
PLATELET # BLD AUTO: 362 10*3/MM3 (ref 140–450)
PMV BLD AUTO: 10.2 FL (ref 6–12)
POTASSIUM SERPL-SCNC: 4 MMOL/L (ref 3.5–5.2)
PROT SERPL-MCNC: 7.6 G/DL (ref 6–8.5)
RBC # BLD AUTO: 5.2 10*6/MM3 (ref 3.77–5.28)
SINUS: 3.1 CM
SODIUM SERPL-SCNC: 135 MMOL/L (ref 136–145)
TSH SERPL DL<=0.05 MIU/L-ACNC: 1.57 UIU/ML (ref 0.27–4.2)
WBC NRBC COR # BLD: 8.99 10*3/MM3 (ref 3.4–10.8)

## 2023-06-06 PROCEDURE — 80053 COMPREHEN METABOLIC PANEL: CPT | Performed by: NURSE PRACTITIONER

## 2023-06-06 PROCEDURE — 83735 ASSAY OF MAGNESIUM: CPT | Performed by: NURSE PRACTITIONER

## 2023-06-06 PROCEDURE — 84443 ASSAY THYROID STIM HORMONE: CPT | Performed by: NURSE PRACTITIONER

## 2023-06-06 PROCEDURE — 93306 TTE W/DOPPLER COMPLETE: CPT | Performed by: INTERNAL MEDICINE

## 2023-06-06 PROCEDURE — 93306 TTE W/DOPPLER COMPLETE: CPT

## 2023-06-06 PROCEDURE — 85027 COMPLETE CBC AUTOMATED: CPT | Performed by: NURSE PRACTITIONER

## 2023-06-07 ENCOUNTER — TELEPHONE (OUTPATIENT)
Dept: CARDIOLOGY | Facility: CLINIC | Age: 63
End: 2023-06-07
Payer: COMMERCIAL

## 2023-06-07 DIAGNOSIS — R06.00 DYSPNEA, UNSPECIFIED TYPE: ICD-10-CM

## 2023-06-07 DIAGNOSIS — R07.89 CHEST PAIN, ATYPICAL: ICD-10-CM

## 2023-06-07 DIAGNOSIS — I49.3 PVC (PREMATURE VENTRICULAR CONTRACTION): ICD-10-CM

## 2023-06-07 DIAGNOSIS — I49.1 PREMATURE ATRIAL CONTRACTIONS: Primary | ICD-10-CM

## 2023-06-07 DIAGNOSIS — I10 PRIMARY HYPERTENSION: ICD-10-CM

## 2023-06-07 NOTE — TELEPHONE ENCOUNTER
Patient reports she canceled stress test yesterday due to not feeling well. She is asking if she can have regular treadmill stress instead of nuclear?

## 2023-06-07 NOTE — TELEPHONE ENCOUNTER
Yes, Irma let me know that she was concerned about having a medication injected.    I will place order for regular stress test.

## 2023-06-13 ENCOUNTER — HOSPITAL ENCOUNTER (OUTPATIENT)
Dept: CARDIOLOGY | Facility: HOSPITAL | Age: 63
Discharge: HOME | End: 2023-06-13
Admitting: NURSE PRACTITIONER
Payer: COMMERCIAL

## 2023-06-13 DIAGNOSIS — I10 PRIMARY HYPERTENSION: ICD-10-CM

## 2023-06-13 DIAGNOSIS — R07.89 CHEST PAIN, ATYPICAL: ICD-10-CM

## 2023-06-13 DIAGNOSIS — I49.1 PREMATURE ATRIAL CONTRACTIONS: ICD-10-CM

## 2023-06-13 DIAGNOSIS — R06.00 DYSPNEA, UNSPECIFIED TYPE: ICD-10-CM

## 2023-06-13 DIAGNOSIS — I49.3 PVC (PREMATURE VENTRICULAR CONTRACTION): ICD-10-CM

## 2023-06-13 LAB
BH CV STRESS RECOVERY BP: NORMAL MMHG
BH CV STRESS RECOVERY HR: 90 BPM
MAXIMAL PREDICTED HEART RATE: 156 BPM
PERCENT MAX PREDICTED HR: 73.08 %
STRESS BASELINE BP: NORMAL MMHG
STRESS BASELINE HR: 76 BPM
STRESS PERCENT HR: 86 %
STRESS POST ESTIMATED WORKLOAD: 4.6 METS
STRESS POST EXERCISE DUR MIN: 1 MIN
STRESS POST EXERCISE DUR SEC: 33 SEC
STRESS POST PEAK BP: NORMAL MMHG
STRESS POST PEAK HR: 114 BPM
STRESS TARGET HR: 133 BPM

## 2023-06-13 PROCEDURE — 93017 CV STRESS TEST TRACING ONLY: CPT

## 2023-06-14 ENCOUNTER — TELEPHONE (OUTPATIENT)
Dept: CARDIOLOGY | Facility: CLINIC | Age: 63
End: 2023-06-14
Payer: COMMERCIAL

## 2023-06-14 DIAGNOSIS — R00.2 PALPITATIONS: ICD-10-CM

## 2023-06-14 DIAGNOSIS — R07.89 CHEST PAIN, ATYPICAL: ICD-10-CM

## 2023-06-14 DIAGNOSIS — E78.00 HYPERCHOLESTEROLEMIA: ICD-10-CM

## 2023-06-14 DIAGNOSIS — R06.00 DYSPNEA, UNSPECIFIED TYPE: ICD-10-CM

## 2023-06-14 DIAGNOSIS — I10 PRIMARY HYPERTENSION: ICD-10-CM

## 2023-06-14 DIAGNOSIS — I49.1 PREMATURE ATRIAL CONTRACTIONS: ICD-10-CM

## 2023-06-14 DIAGNOSIS — I10 PRIMARY HYPERTENSION: Primary | ICD-10-CM

## 2023-06-14 RX ORDER — ENALAPRIL MALEATE 10 MG/1
20 TABLET ORAL 2 TIMES DAILY
Qty: 180 TABLET | Refills: 3 | Status: SHIPPED | OUTPATIENT
Start: 2023-06-14 | End: 2023-06-14 | Stop reason: SDUPTHER

## 2023-06-14 RX ORDER — ENALAPRIL MALEATE 20 MG/1
20 TABLET ORAL 2 TIMES DAILY
Qty: 180 TABLET | Refills: 3 | Status: SHIPPED | OUTPATIENT
Start: 2023-06-14

## 2023-06-14 NOTE — PROGRESS NOTES
Pt made aware of stress test results. Discussed provider recommendations. Verbalizes understanding. States she would like to try the medication changes first. Reports she is in process of losing wt and would like to wait and see if she will be able to do the treadmill again at another time. Pt refuses lexiscan and CTA at this time. Request new rx vasotec 20 mg, 1 tablet twice daily, to be sent to [facility]. Rx pending provider signature. Pt will notify our office if she changes her mind and would like to get testing done.

## 2023-06-14 NOTE — TELEPHONE ENCOUNTER
[facility] called and left VM, they need clarification on enalapril script.  Script has two different directions.  They received:    Sig: Take 2 tablets by mouth 2 (Two) Times a Day. 2 tabs in AM, 1 tab in PM    See stress test result note, patient is to increase to 20 mg BID.  I will pend new script for enalapril 20 mg BID to be cosigned.

## 2023-06-16 NOTE — TELEPHONE ENCOUNTER
Attempted to notify patient Arnol Mclaughlin will call to schedule CTA, unable to reach and unable to leave message for call back.

## 2023-06-19 VITALS — BODY MASS INDEX: 37.25 KG/M2 | WEIGHT: 229 LBS

## 2023-06-19 DIAGNOSIS — R06.00 DYSPNEA, UNSPECIFIED TYPE: ICD-10-CM

## 2023-06-19 DIAGNOSIS — I49.3 PVC (PREMATURE VENTRICULAR CONTRACTION): ICD-10-CM

## 2023-06-19 DIAGNOSIS — E66.01 MORBID OBESITY WITH BMI OF 40.0-44.9, ADULT: ICD-10-CM

## 2023-06-19 DIAGNOSIS — I49.1 PREMATURE ATRIAL CONTRACTIONS: ICD-10-CM

## 2023-06-19 DIAGNOSIS — I10 PRIMARY HYPERTENSION: ICD-10-CM

## 2023-06-19 DIAGNOSIS — E78.00 HYPERCHOLESTEROLEMIA: ICD-10-CM

## 2023-06-19 NOTE — TELEPHONE ENCOUNTER
Spoke with pt, updated wt in chart.  Pt aware that they will be having CTA in [place], she voiced understanding.

## 2023-08-02 ENCOUNTER — TELEPHONE (OUTPATIENT)
Dept: CARDIOLOGY | Facility: CLINIC | Age: 63
End: 2023-08-02
Payer: COMMERCIAL

## 2023-08-14 ENCOUNTER — APPOINTMENT (OUTPATIENT)
Dept: CARDIOLOGY | Facility: HOSPITAL | Age: 63
End: 2023-08-14
Payer: COMMERCIAL

## 2023-08-24 ENCOUNTER — TELEPHONE (OUTPATIENT)
Dept: CARDIOLOGY | Facility: CLINIC | Age: 63
End: 2023-08-24
Payer: COMMERCIAL

## 2023-08-24 NOTE — TELEPHONE ENCOUNTER
"Spoke with patient concerning coreg. She states \"I have to get up and stay busy in the mornings to get my heart rate up to 63 so I can take my Coreg. Today about 3 hours after I took my medication my HR was 49 and I had already drunk 2 caffeine pops to keep my heart rate up\". I was just wandering if I could stop the evening 1/2 tablet to see if that helps\"?  B/P 127/76  "

## 2023-08-24 NOTE — TELEPHONE ENCOUNTER
Patient left a , she has questions regarding her Coreg dose. Her message stated she is taking one tablet in AM and 1/2 tablet in PM and having episodes of HR around 49 BPM, asking if she can lower her dose.  [phone]

## 2023-08-28 ENCOUNTER — HOSPITAL ENCOUNTER (OUTPATIENT)
Dept: CARDIOLOGY | Facility: HOSPITAL | Age: 63
Discharge: HOME | End: 2023-08-28
Payer: COMMERCIAL

## 2023-08-28 ENCOUNTER — OFFICE VISIT (OUTPATIENT)
Dept: CARDIOLOGY | Facility: CLINIC | Age: 63
End: 2023-08-28
Payer: COMMERCIAL

## 2023-08-28 VITALS
HEIGHT: 66 IN | BODY MASS INDEX: 33.43 KG/M2 | WEIGHT: 208 LBS | DIASTOLIC BLOOD PRESSURE: 80 MMHG | SYSTOLIC BLOOD PRESSURE: 120 MMHG | HEART RATE: 68 BPM

## 2023-08-28 DIAGNOSIS — I49.3 PVC (PREMATURE VENTRICULAR CONTRACTION): ICD-10-CM

## 2023-08-28 DIAGNOSIS — R06.00 DYSPNEA, UNSPECIFIED TYPE: ICD-10-CM

## 2023-08-28 DIAGNOSIS — I49.1 PREMATURE ATRIAL CONTRACTIONS: ICD-10-CM

## 2023-08-28 DIAGNOSIS — R07.89 CHEST PAIN, ATYPICAL: ICD-10-CM

## 2023-08-28 DIAGNOSIS — R00.2 PALPITATIONS: ICD-10-CM

## 2023-08-28 DIAGNOSIS — I10 PRIMARY HYPERTENSION: Primary | ICD-10-CM

## 2023-08-28 DIAGNOSIS — F41.9 ANXIETY: ICD-10-CM

## 2023-08-28 LAB
BH CV REST NUCLEAR ISOTOPE DOSE: 10 MCI
BH CV STRESS NUCLEAR ISOTOPE DOSE: 30 MCI
BH CV STRESS RECOVERY BP: NORMAL MMHG
BH CV STRESS RECOVERY HR: 77 BPM
LV EF NUC BP: 77 %
MAXIMAL PREDICTED HEART RATE: 156 BPM
PERCENT MAX PREDICTED HR: 73.72 %
STRESS BASELINE BP: NORMAL MMHG
STRESS BASELINE HR: 63 BPM
STRESS PERCENT HR: 87 %
STRESS POST ESTIMATED WORKLOAD: 3.4 METS
STRESS POST EXERCISE DUR MIN: 5 MIN
STRESS POST EXERCISE DUR SEC: 40 SEC
STRESS POST PEAK BP: NORMAL MMHG
STRESS POST PEAK HR: 115 BPM
STRESS TARGET HR: 133 BPM

## 2023-08-28 PROCEDURE — 3074F SYST BP LT 130 MM HG: CPT | Performed by: NURSE PRACTITIONER

## 2023-08-28 PROCEDURE — 99214 OFFICE O/P EST MOD 30 MIN: CPT | Performed by: NURSE PRACTITIONER

## 2023-08-28 PROCEDURE — 93017 CV STRESS TEST TRACING ONLY: CPT

## 2023-08-28 PROCEDURE — 1160F RVW MEDS BY RX/DR IN RCRD: CPT | Performed by: NURSE PRACTITIONER

## 2023-08-28 PROCEDURE — 0 TECHNETIUM SESTAMIBI: Performed by: INTERNAL MEDICINE

## 2023-08-28 PROCEDURE — 3079F DIAST BP 80-89 MM HG: CPT | Performed by: NURSE PRACTITIONER

## 2023-08-28 PROCEDURE — 78452 HT MUSCLE IMAGE SPECT MULT: CPT

## 2023-08-28 PROCEDURE — A9500 TC99M SESTAMIBI: HCPCS | Performed by: INTERNAL MEDICINE

## 2023-08-28 PROCEDURE — 1159F MED LIST DOCD IN RCRD: CPT | Performed by: NURSE PRACTITIONER

## 2023-08-28 RX ORDER — CARVEDILOL 3.12 MG/1
TABLET ORAL
COMMUNITY
End: 2023-08-28 | Stop reason: SDUPTHER

## 2023-08-28 RX ORDER — CARVEDILOL 3.12 MG/1
3.12 TABLET ORAL 2 TIMES DAILY WITH MEALS
Qty: 180 TABLET | Refills: 2 | Status: SHIPPED | OUTPATIENT
Start: 2023-08-28 | End: 2023-08-28 | Stop reason: SDUPTHER

## 2023-08-28 RX ORDER — CARVEDILOL 3.12 MG/1
TABLET ORAL
Qty: 90 TABLET | Refills: 2 | Status: SHIPPED | OUTPATIENT
Start: 2023-08-28

## 2023-08-28 RX ORDER — GABAPENTIN 300 MG/1
300 CAPSULE ORAL DAILY PRN
COMMUNITY

## 2023-08-28 RX ADMIN — TECHNETIUM TC 99M SESTAMIBI 1 DOSE: 1 INJECTION INTRAVENOUS at 09:03

## 2023-08-28 RX ADMIN — TECHNETIUM TC 99M SESTAMIBI 1 DOSE: 1 INJECTION INTRAVENOUS at 11:03

## 2023-08-28 NOTE — LETTER
August 29, 2023       No Recipients    Patient: Brianna Escalera   YOB: 1960   Date of Visit: 8/28/2023       Dear MONIQUE Moreno    Brianna Escalera was in my office today. Below is a copy of my note.    If you have questions, please do not hesitate to call me. I look forward to following Brianna along with you.         Sincerely,        MONIQUE Orona        CC:   No Recipients       Chief Complaint   Patient presents with    Follow-up     Cardiac management    Lab     Last labs in chart.     Medication     She has decreased Coreg to 3.125 mg 1/2 tablet BID. Heart rate is doing better with decrease dose.    Weight loss     She is watching diet closely.     Med Refill     Needs refills on coreg-90 day.     Subjective      Brianna Escalera is a 63 y.o. female with HTN, hypercholesterolemia, palpitations and significant anxiety.  She was referred back in October 2021 after not being seen for 6 years. Stress test in 2015 was inconclusive. Lopressor changed to Coreg for better BP and HR control. She associated much of her symptoms with GI fullness. Echo and Holter showed normal LV function, average HR 63 with 6% PACs and 1% PVC.  We tried diltiazem but did not tolerate, carvedilol resumed. Multiple dosage adjustments made.   Regular stress, echo and holter repeated for persistent symptoms. Inconclusive stress, normal EF, 8.1% PACs, 1% PVC, 16 SVT. Recommended flecainide added to Coreg if nuclear stress showed no ischemia.     She returns today for follow up. She is scheduled for nuclear stress at 9 am this morning. She has followed strict, low calorie diet x 2 months, has lost 41 lbs. She is walking more and feels she is ready for the stress test. She walked only 1 minute previously. Lipids 6/17/2023: , , HDL 41, LDL 89.  CMP stable, GFR 97, H/H14.6/45, mag 1.7, TSH 1.57.  Magnesium 400 mg daily added.        Cardiac History:    Past Surgical History:   Procedure Laterality Date     CARDIOVASCULAR STRESS TEST  11/17/2015    R.Stress- 5 Min. 74% THR. BP- 156/80. Inconclusive    CARDIOVASCULAR STRESS TEST  06/13/2023    R.Stress. 1.33 Min. 4.6 METS.73% THR. 186/58. Inconclusive    CARDIOVASCULAR STRESS TEST  08/28/2023    (Mod) 5.40 Min. 3.4 METS. 73% THR. 191/84. EF > 70%. R/O Breast attenuation    CONVERTED (HISTORICAL) HOLTER  07/15/2023    < 3 Days. Avg 58. 41-89. 8.1% PAC. 16 SVT.    ECHO - CONVERTED  12/08/2021    TLS.EF 60%. Trace -Mild MR. RVSP- 21 mmHg    ECHO - CONVERTED  06/06/2023    TLS. EF 60%. LA-4.2. Trace-Mild MR. RVSP- 29 mmHg    OTHER SURGICAL HISTORY  12/04/2021    Event monitor- 1 day. Avg 63. . 6% PAC. 1 % PVC     Current Outpatient Medications   Medication Sig Dispense Refill    12 Hour Nasal Spray 0.05 % nasal spray 2 sprays Daily.      buPROPion XL (WELLBUTRIN XL) 150 MG 24 hr tablet 1 tablet 4 (Four) Times a Week.      Cholecalciferol (Vitamin D3) 25 MCG (1000 UT) capsule Take 1 capsule by mouth Daily.      docusate sodium (COLACE) 100 MG capsule Take 1 capsule by mouth Daily.      docusate sodium (COLACE) 250 MG capsule Take 1 capsule by mouth Daily.      enalapril (VASOTEC) 20 MG tablet Take 1 tablet by mouth 2 (Two) Times a Day. 180 tablet 3    fluticasone (FLONASE) 50 MCG/ACT nasal spray 1 spray into the nostril(s) as directed by provider Daily.      furosemide (LASIX) 20 MG tablet Take 1 tablet by mouth Daily. Takes 1/2 tablet PRN (Patient taking differently: Take 0.5 tablets by mouth Daily As Needed. Takes 1/2 tablet PRN) 90 tablet 3    gabapentin (NEURONTIN) 300 MG capsule Take 1 capsule by mouth Daily As Needed.      HYDROcodone-acetaminophen (NORCO)  MG per tablet Take 1 tablet by mouth 3 (Three) Times a Day As Needed for Moderate Pain.      Linzess 145 MCG capsule capsule 1 capsule Daily.      loratadine (CLARITIN) 10 MG tablet Take 1 tablet by mouth Daily.      magnesium oxide (MAG-OX) 400 MG tablet Take 1 tablet by mouth Daily.  90 tablet 3    Omega-3 Fatty Acids (fish oil) 1000 MG capsule capsule Take 1 capsule by mouth 2 (Two) Times a Day With Meals.      pantoprazole (PROTONIX) 40 MG EC tablet Take 1 tablet by mouth Daily.      potassium chloride (K-DUR,KLOR-CON) 20 MEQ CR tablet Daily As Needed.      sertraline (ZOLOFT) 50 MG tablet Take 1 tablet by mouth 4 (Four) Times a Week.      triamterene-hydrochlorothiazide (MAXZIDE) 75-50 MG per tablet Take 1 tablet by mouth Daily. 90 tablet 3    carvedilol (COREG) 3.125 MG tablet 1/2 tablet bid 90 tablet 2     No current facility-administered medications for this visit.     Amlodipine, Azithromycin, Bactrim [sulfamethoxazole-trimethoprim], Cephalexin, Diltiazem, Other, Penicillins, Sulfa antibiotics, and Trimethoprim    Past Medical History:   Diagnosis Date    Anxiety     panic attacks    Cancer     uterine    Chronic back pain     Depression     Diabetes mellitus     GERD (gastroesophageal reflux disease)     GERD (gastroesophageal reflux disease)     H/O hernia repair     H/O: hysterectomy     Heart murmur     History of cholecystectomy     Hx of appendectomy     Hypertension      Social History     Socioeconomic History    Marital status:    Tobacco Use    Smoking status: Former     Packs/day: 1.00     Years: 20.00     Pack years: 20.00     Types: Cigarettes     Quit date:      Years since quittin.6    Smokeless tobacco: Never    Tobacco comments:     Quit    Vaping Use    Vaping Use: Never used   Substance and Sexual Activity    Alcohol use: No    Drug use: Never    Sexual activity: Defer     Family History   Problem Relation Age of Onset    Heart attack Mother         multiple. First in 40's    Hypertension Mother     Diabetes Mother     Cancer Mother     Heart disease Mother     No Known Problems Father     Heart disease Other         palpatations    Hypertension Sister     Heart attack Sister     Kidney disease Sister      Review  "of Systems   Constitutional: Positive for weight loss (-41 pounds). Negative for decreased appetite.   HENT: Negative.     Eyes:  Negative for blurred vision.   Cardiovascular:  Positive for chest pain, dyspnea on exertion and palpitations. Negative for leg swelling and syncope.   Respiratory:  Negative for shortness of breath and sleep disturbances due to breathing.    Endocrine: Negative.    Hematologic/Lymphatic: Negative for bleeding problem. Does not bruise/bleed easily.   Skin: Negative.    Musculoskeletal:  Negative for falls and myalgias.   Gastrointestinal:  Negative for abdominal pain, heartburn and melena.   Genitourinary:  Negative for hematuria.   Neurological:  Negative for dizziness and light-headedness.   Psychiatric/Behavioral:  Negative for altered mental status. The patient is nervous/anxious.    Allergic/Immunologic: Negative.       Objective    /80 (BP Location: Right arm)   Pulse 68   Ht 167 cm (65.75\")   Wt 94.3 kg (208 lb)   BMI 33.83 kg/mý     Vitals and nursing note reviewed.   Constitutional:       General: Not in acute distress.     Appearance: Well-developed. Not diaphoretic.   Eyes:      Pupils: Pupils are equal, round, and reactive to light.   HENT:      Head: Normocephalic.   Pulmonary:      Effort: Pulmonary effort is normal. No respiratory distress.      Breath sounds: Normal breath sounds.   Cardiovascular:      Normal rate. Regular rhythm.   Pulses:     Intact distal pulses.   Edema:     Peripheral edema absent.   Abdominal:      General: Bowel sounds are normal.      Palpations: Abdomen is soft.   Musculoskeletal: Normal range of motion.      Cervical back: Normal range of motion. Skin:     General: Skin is warm and dry.   Neurological:      Mental Status: Alert and oriented to person, place, and time.        Procedures          Problem List Items Addressed This Visit          Cardiac and Vasculature    Primary hypertension - Primary    Palpitations    Premature atrial " contractions    PVC (premature ventricular contraction)       Mental Health    Anxiety      HTN  -Excellent blood pressure control, 120/80   -Continue carvedilol, enalapril, Maxide   - congratulated on weight loss efforts      Palpitations/PAC/PVC/SVT  -Multiple arrhythmias on recent Holter   -8.1% PAC, 1% PVC, 16 runs of SVT   -Continue carvedilol, magnesium supplement  -If no ischemia noted on stress, recommend adding flecainide 50 mg twice daily     Hyperlipidemia  -Lipids very well controlled with LDL 89, HDL 41, triglycerides 131  -Continue fish oil   -Continue low calorie, low sugar diet  -She was congratulated on weight loss efforts     Dyspnea on exertion  -Recommend proceeding with nuclear cardiac stress test today as the regular stress was inconclusive due to exercise intolerance  -Echocardiogram stable with normal LVEF      Follow-up 6 months or sooner if needed.  Further recommendations to follow nuclear stress.              Electronically signed by MONIQUE Orona,  August 29, 2023 08:14 EDT

## 2023-08-28 NOTE — TELEPHONE ENCOUNTER
Received call from pharmacy requesting clarification on coreg script due to script went to pharmacy with 2 directions.    Clarification script attached.

## 2023-08-28 NOTE — PROGRESS NOTES
Chief Complaint   Patient presents with    Follow-up     Cardiac management    Lab     Last labs in chart.     Medication     She has decreased Coreg to 3.125 mg 1/2 tablet BID. Heart rate is doing better with decrease dose.    Weight loss     She is watching diet closely.     Med Refill     Needs refills on coreg-90 day.     Subjective       Brianna Escalera is a 63 y.o. female with HTN, hypercholesterolemia, palpitations and significant anxiety.  She was referred back in October 2021 after not being seen for 6 years. Stress test in 2015 was inconclusive. Lopressor changed to Coreg for better BP and HR control. She associated much of her symptoms with GI fullness. Echo and Holter showed normal LV function, average HR 63 with 6% PACs and 1% PVC.  We tried diltiazem but did not tolerate, carvedilol resumed. Multiple dosage adjustments made.   Regular stress, echo and holter repeated for persistent symptoms. Inconclusive stress, normal EF, 8.1% PACs, 1% PVC, 16 SVT. Recommended flecainide added to Coreg if nuclear stress showed no ischemia.     She returns today for follow up. She is scheduled for nuclear stress at 9 am this morning. She has followed strict, low calorie diet x 2 months, has lost 41 lbs. She is walking more and feels she is ready for the stress test. She walked only 1 minute previously. Lipids 6/17/2023: , , HDL 41, LDL 89.  CMP stable, GFR 97, H/H14.6/45, mag 1.7, TSH 1.57.  Magnesium 400 mg daily added.        Cardiac History:    Past Surgical History:   Procedure Laterality Date    CARDIOVASCULAR STRESS TEST  11/17/2015    R.Stress- 5 Min. 74% THR. BP- 156/80. Inconclusive    CARDIOVASCULAR STRESS TEST  06/13/2023    R.Stress. 1.33 Min. 4.6 METS.73% THR. 186/58. Inconclusive    CARDIOVASCULAR STRESS TEST  08/28/2023    (Mod) 5.40 Min. 3.4 METS. 73% THR. 191/84. EF > 70%. R/O Breast attenuation    CONVERTED (HISTORICAL) HOLTER  07/15/2023    < 3 Days. Avg 58. 41-89. 8.1% PAC. 16 SVT.     ECHO - CONVERTED  12/08/2021    TLS.EF 60%. Trace -Mild MR. RVSP- 21 mmHg    ECHO - CONVERTED  06/06/2023    TLS. EF 60%. LA-4.2. Trace-Mild MR. RVSP- 29 mmHg    OTHER SURGICAL HISTORY  12/04/2021    Event monitor- 1 day. Avg 63. . 6% PAC. 1 % PVC     Current Outpatient Medications   Medication Sig Dispense Refill    12 Hour Nasal Spray 0.05 % nasal spray 2 sprays Daily.      buPROPion XL (WELLBUTRIN XL) 150 MG 24 hr tablet 1 tablet 4 (Four) Times a Week.      Cholecalciferol (Vitamin D3) 25 MCG (1000 UT) capsule Take 1 capsule by mouth Daily.      docusate sodium (COLACE) 100 MG capsule Take 1 capsule by mouth Daily.      docusate sodium (COLACE) 250 MG capsule Take 1 capsule by mouth Daily.      enalapril (VASOTEC) 20 MG tablet Take 1 tablet by mouth 2 (Two) Times a Day. 180 tablet 3    fluticasone (FLONASE) 50 MCG/ACT nasal spray 1 spray into the nostril(s) as directed by provider Daily.      furosemide (LASIX) 20 MG tablet Take 1 tablet by mouth Daily. Takes 1/2 tablet PRN (Patient taking differently: Take 0.5 tablets by mouth Daily As Needed. Takes 1/2 tablet PRN) 90 tablet 3    gabapentin (NEURONTIN) 300 MG capsule Take 1 capsule by mouth Daily As Needed.      HYDROcodone-acetaminophen (NORCO)  MG per tablet Take 1 tablet by mouth 3 (Three) Times a Day As Needed for Moderate Pain.      Linzess 145 MCG capsule capsule 1 capsule Daily.      loratadine (CLARITIN) 10 MG tablet Take 1 tablet by mouth Daily.      magnesium oxide (MAG-OX) 400 MG tablet Take 1 tablet by mouth Daily. 90 tablet 3    Omega-3 Fatty Acids (fish oil) 1000 MG capsule capsule Take 1 capsule by mouth 2 (Two) Times a Day With Meals.      pantoprazole (PROTONIX) 40 MG EC tablet Take 1 tablet by mouth Daily.      potassium chloride (K-DUR,KLOR-CON) 20 MEQ CR tablet Daily As Needed.      sertraline (ZOLOFT) 50 MG tablet Take 1 tablet by mouth 4 (Four) Times a Week.      triamterene-hydrochlorothiazide (MAXZIDE) 75-50 MG per tablet Take  1 tablet by mouth Daily. 90 tablet 3    carvedilol (COREG) 3.125 MG tablet 1/2 tablet bid 90 tablet 2     No current facility-administered medications for this visit.     Amlodipine, Azithromycin, Bactrim [sulfamethoxazole-trimethoprim], Cephalexin, Diltiazem, Other, Penicillins, Sulfa antibiotics, and Trimethoprim    Past Medical History:   Diagnosis Date    Anxiety     panic attacks    Cancer     uterine    Chronic back pain     Depression     Diabetes mellitus     GERD (gastroesophageal reflux disease)     GERD (gastroesophageal reflux disease)     H/O hernia repair     H/O: hysterectomy     Heart murmur     History of cholecystectomy     Hx of appendectomy     Hypertension      Social History     Socioeconomic History    Marital status:    Tobacco Use    Smoking status: Former     Packs/day: 1.00     Years: 20.00     Pack years: 20.00     Types: Cigarettes     Quit date:      Years since quittin.6    Smokeless tobacco: Never    Tobacco comments:     Quit    Vaping Use    Vaping Use: Never used   Substance and Sexual Activity    Alcohol use: No    Drug use: Never    Sexual activity: Defer     Family History   Problem Relation Age of Onset    Heart attack Mother         multiple. First in 40's    Hypertension Mother     Diabetes Mother     Cancer Mother     Heart disease Mother     No Known Problems Father     Heart disease Other         palpatations    Hypertension Sister     Heart attack Sister     Kidney disease Sister      Review of Systems   Constitutional: Positive for weight loss (-41 pounds). Negative for decreased appetite.   HENT: Negative.     Eyes:  Negative for blurred vision.   Cardiovascular:  Positive for chest pain, dyspnea on exertion and palpitations. Negative for leg swelling and syncope.   Respiratory:  Negative for shortness of breath and sleep disturbances due to breathing.    Endocrine: Negative.    Hematologic/Lymphatic: Negative for bleeding problem. Does not  "bruise/bleed easily.   Skin: Negative.    Musculoskeletal:  Negative for falls and myalgias.   Gastrointestinal:  Negative for abdominal pain, heartburn and melena.   Genitourinary:  Negative for hematuria.   Neurological:  Negative for dizziness and light-headedness.   Psychiatric/Behavioral:  Negative for altered mental status. The patient is nervous/anxious.    Allergic/Immunologic: Negative.       Objective     /80 (BP Location: Right arm)   Pulse 68   Ht 167 cm (65.75\")   Wt 94.3 kg (208 lb)   BMI 33.83 kg/mý     Vitals and nursing note reviewed.   Constitutional:       General: Not in acute distress.     Appearance: Well-developed. Not diaphoretic.   Eyes:      Pupils: Pupils are equal, round, and reactive to light.   HENT:      Head: Normocephalic.   Pulmonary:      Effort: Pulmonary effort is normal. No respiratory distress.      Breath sounds: Normal breath sounds.   Cardiovascular:      Normal rate. Regular rhythm.   Pulses:     Intact distal pulses.   Edema:     Peripheral edema absent.   Abdominal:      General: Bowel sounds are normal.      Palpations: Abdomen is soft.   Musculoskeletal: Normal range of motion.      Cervical back: Normal range of motion. Skin:     General: Skin is warm and dry.   Neurological:      Mental Status: Alert and oriented to person, place, and time.        Procedures          Problem List Items Addressed This Visit          Cardiac and Vasculature    Primary hypertension - Primary    Palpitations    Premature atrial contractions    PVC (premature ventricular contraction)       Mental Health    Anxiety      HTN  -Excellent blood pressure control, 120/80   -Continue carvedilol, enalapril, Maxide   - congratulated on weight loss efforts      Palpitations/PAC/PVC/SVT  -Multiple arrhythmias on recent Holter   -8.1% PAC, 1% PVC, 16 runs of SVT   -Continue carvedilol, magnesium supplement  -If no ischemia noted on stress, recommend adding flecainide 50 mg twice daily   "   Hyperlipidemia  -Lipids very well controlled with LDL 89, HDL 41, triglycerides 131  -Continue fish oil   -Continue low calorie, low sugar diet  -She was congratulated on weight loss efforts     Dyspnea on exertion  -Recommend proceeding with nuclear cardiac stress test today as the regular stress was inconclusive due to exercise intolerance  -Echocardiogram stable with normal LVEF      Follow-up 6 months or sooner if needed.  Further recommendations to follow nuclear stress.              Electronically signed by MONIQUE Orona,  August 29, 2023 08:14 EDT

## 2023-08-29 PROBLEM — E66.01 MORBID OBESITY WITH BMI OF 40.0-44.9, ADULT: Status: RESOLVED | Noted: 2021-10-12 | Resolved: 2023-08-29

## 2023-08-30 ENCOUNTER — TELEPHONE (OUTPATIENT)
Dept: CARDIOLOGY | Facility: CLINIC | Age: 63
End: 2023-08-30
Payer: COMMERCIAL

## 2023-08-31 RX ORDER — LOSARTAN POTASSIUM 50 MG/1
50 TABLET ORAL DAILY
Qty: 90 TABLET | Refills: 3 | Status: SHIPPED | OUTPATIENT
Start: 2023-08-31

## 2023-08-31 NOTE — TELEPHONE ENCOUNTER
Attempted to notify patient of stress test results, unable to reach and unable to leave message due to no voice mail set up.

## 2023-08-31 NOTE — TELEPHONE ENCOUNTER
I'm not sure what happened.     Stress test showed she did much better with walking on the treadmill this time.     5 min 40 secs compared to 1 min. BP increased to 191/84 at peak exercise. Dr. Kebede recommends adding amlodipine 5 mg once daily for bp. That is on her allergy list. Let's try losartan 50 mg daily in place of enalapril. Then can titrate as needed. STOP ENALAPRIL.    Images showed no ischemia- anterior wall changes felt to be related to breast tissue artifact. If she has any further chest pain, will proceed with cath.

## 2023-09-01 NOTE — TELEPHONE ENCOUNTER
Patient made aware of stress test results and recommendations to stop Enalapril. Start losartan 50 mg daily in place of enalapril, understanding voiced. Aware if she has any further chest pain, will proceed with cath. Patient reports having no chest pain and will call if has any further problems.

## 2023-09-13 ENCOUNTER — TELEPHONE (OUTPATIENT)
Dept: CARDIOLOGY | Facility: CLINIC | Age: 63
End: 2023-09-13
Payer: COMMERCIAL

## 2023-09-13 NOTE — TELEPHONE ENCOUNTER
Called patient back  and received message that voicemail box has not been set up , will call again

## 2023-09-13 NOTE — TELEPHONE ENCOUNTER
I spoke with patient and she reports  her HR has been dropping in the 50's . She is currently taking Coreg 3.125 mg 1/2 tablet twice daily , she is still taking Enalapril 20 mg in Am and 15 mg in PM ( taking 10 mg tablet + 1/2 tab  of 10 mg )  She has not started Losartan  vs Enalapril at this time because fear of low BP  .Reports BP is fine at this time.

## 2023-09-28 ENCOUNTER — TELEPHONE (OUTPATIENT)
Dept: CARDIOLOGY | Facility: CLINIC | Age: 63
End: 2023-09-28
Payer: COMMERCIAL

## 2023-09-28 NOTE — TELEPHONE ENCOUNTER
Attempted to contact patient, unable to reach and unable to leave message due to no voice mail set up.

## 2023-09-29 NOTE — TELEPHONE ENCOUNTER
Patient was asking if losartan is similar to enalapril. She was made aware that they are similar, but technically different classes. She was asking if losartan effected HR, she was made aware that it does not and enalapril also does not effect HR, they both work to bring down BP.

## 2024-02-01 ENCOUNTER — TELEPHONE (OUTPATIENT)
Dept: CARDIOLOGY | Facility: CLINIC | Age: 64
End: 2024-02-01
Payer: MEDICARE

## 2024-02-01 DIAGNOSIS — R00.2 PALPITATIONS: ICD-10-CM

## 2024-02-01 DIAGNOSIS — I49.1 PREMATURE ATRIAL CONTRACTIONS: Primary | ICD-10-CM

## 2024-02-01 NOTE — TELEPHONE ENCOUNTER
Spoke with patient concerning palpitations. Patient reports recent onset of palpitations again in the mornings. She did have covid about 3 weeks ago, did have elevated HR for 3 days, at that time HR did get to 110. Her /70s to 130/70s and HR 50-60.     Medications  Enalapril 20 mg in am and 1/4 tablet at night  Maxzide 75-50 mg daily

## 2024-02-01 NOTE — TELEPHONE ENCOUNTER
Caller: Brianna Escalera    Relationship to patient: Self    Best call back number: [phone]    Chief complaint: PALPITATIONS    Type of visit: FOLLOW UP    Requested date:  ASAP    If rescheduling, when is the original appointment: 3-12-24     Additional notes: PATIENT CALLED IN ASKING TO HAVE HER 3-12-24 APPOINTMENT MOVED SOONER DUE TO HAVE SOME HEART PALPITATIONS ESPECIALLY IN THE MORNING WHEN GETTING UP TO START HER DAY. WHEN SHE SIT UP AND THEN TRIES TO GET UP ON HER FEET IS WHEN THE PALPITATIONS START. PLEASE REACH OUT TO PATIENT TO SCHEDULE.

## 2024-02-02 NOTE — TELEPHONE ENCOUNTER
Spoke with patient concerning symptoms and recommendations. Patient wishes to repeat holter monitor to see if something else is going on since she had covid.

## 2024-02-02 NOTE — TELEPHONE ENCOUNTER
Last holter showed 8% PAC, 16 episodes of SVT. Avg hr only 58. We had to stop coreg due to bradycardia    We can try flecainide 50 mg BID. If she wants to come in to discuss, we can get an EKG and bp check and start flecainide. Alternative would be PRN beta blocker.     If she doesn't want to do that, we can repeat holter and see what else may be going on.     Covid can worsen palpitations for a period of time.

## 2024-02-08 ENCOUNTER — TELEPHONE (OUTPATIENT)
Dept: CARDIOLOGY | Facility: CLINIC | Age: 64
End: 2024-02-08
Payer: MEDICARE

## 2024-02-08 DIAGNOSIS — E78.00 HYPERCHOLESTEROLEMIA: Primary | ICD-10-CM

## 2024-02-08 NOTE — TELEPHONE ENCOUNTER
Patient would like an order for FLP faxed to [facility]. Please let patient know if we can do this.  Thank you in advance.

## 2024-02-09 NOTE — TELEPHONE ENCOUNTER
Order for lipid panel sent to [facility] and patient aware lab order has been sent.

## 2024-02-27 ENCOUNTER — TELEPHONE (OUTPATIENT)
Dept: CARDIOLOGY | Facility: CLINIC | Age: 64
End: 2024-02-27
Payer: MEDICARE

## 2024-02-27 NOTE — TELEPHONE ENCOUNTER
Caller: Brianna Escalera    Relationship: Self    Best call back number: [phone]    What is the best time to reach you: ANY    What was the call regarding: PATIENT HAD A MISSED CALL, NOT SURE WHO. PLEASE CALL BACK    Is it okay if the provider responds through MyChart: NO

## 2024-03-12 ENCOUNTER — OFFICE VISIT (OUTPATIENT)
Dept: CARDIOLOGY | Facility: CLINIC | Age: 64
End: 2024-03-12
Payer: MEDICARE

## 2024-03-12 VITALS
WEIGHT: 195.4 LBS | BODY MASS INDEX: 31.4 KG/M2 | SYSTOLIC BLOOD PRESSURE: 130 MMHG | DIASTOLIC BLOOD PRESSURE: 78 MMHG | HEART RATE: 64 BPM | HEIGHT: 66 IN

## 2024-03-12 DIAGNOSIS — I49.1 PREMATURE ATRIAL CONTRACTIONS: ICD-10-CM

## 2024-03-12 DIAGNOSIS — I10 PRIMARY HYPERTENSION: Primary | ICD-10-CM

## 2024-03-12 DIAGNOSIS — R00.2 PALPITATIONS: ICD-10-CM

## 2024-03-12 DIAGNOSIS — I49.3 PVC (PREMATURE VENTRICULAR CONTRACTION): ICD-10-CM

## 2024-03-12 DIAGNOSIS — E78.00 HYPERCHOLESTEROLEMIA: ICD-10-CM

## 2024-03-12 DIAGNOSIS — I47.10 PAROXYSMAL SVT (SUPRAVENTRICULAR TACHYCARDIA): ICD-10-CM

## 2024-03-12 PROCEDURE — 1159F MED LIST DOCD IN RCRD: CPT | Performed by: NURSE PRACTITIONER

## 2024-03-12 PROCEDURE — 3078F DIAST BP <80 MM HG: CPT | Performed by: NURSE PRACTITIONER

## 2024-03-12 PROCEDURE — 99214 OFFICE O/P EST MOD 30 MIN: CPT | Performed by: NURSE PRACTITIONER

## 2024-03-12 PROCEDURE — 1160F RVW MEDS BY RX/DR IN RCRD: CPT | Performed by: NURSE PRACTITIONER

## 2024-03-12 PROCEDURE — 3075F SYST BP GE 130 - 139MM HG: CPT | Performed by: NURSE PRACTITIONER

## 2024-03-12 RX ORDER — ENALAPRIL MALEATE 20 MG/1
20 TABLET ORAL 2 TIMES DAILY
Qty: 180 TABLET | Refills: 3 | Status: SHIPPED | OUTPATIENT
Start: 2024-03-12

## 2024-03-12 RX ORDER — TRIAMTERENE AND HYDROCHLOROTHIAZIDE 75; 50 MG/1; MG/1
1 TABLET ORAL DAILY
Qty: 90 TABLET | Refills: 3 | Status: SHIPPED | OUTPATIENT
Start: 2024-03-12

## 2024-03-12 RX ORDER — ENALAPRIL MALEATE 20 MG/1
35 TABLET ORAL DAILY
COMMUNITY
End: 2024-03-12 | Stop reason: SDUPTHER

## 2024-03-12 NOTE — PROGRESS NOTES
Chief Complaint   Patient presents with    Follow-up     Cardiac management    Lab     Last labs in chart.     Weight loss     Watching diet closely.    Blood pressure     Monitoring closely, averaging 130s/70s. She has not been taking coreg, didn't start losartan. Has been taking 35 mg of Enalapril daily.    Med Refill     Needs refills on Maxzide and enalapril-90 day.     Subjective       Brianna Escalera is a 64 y.o. female with HTN, hypercholesterolemia, palpitations and significant anxiety.  She was referred back to our office in October 2021 after not being seen for 6 years. Stress test in 2015 was inconclusive. Lopressor changed to Coreg. Echo and Holter showed normal LV function, average HR 63 with 6% PACs and 1% PVC.  We tried diltiazem but did not tolerate, carvedilol resumed. Multiple dosage adjustments made.     Nuclear stress 8/2023 showed no ischemia, normal LVEF. Holter 2/2024 showed avg rate 59, 7% PAC, 65 SVT. Flecainide recommended, but she was hesitant to start.     She returns today for follow up. She has lost >50 lbs in the last year with dietary changes and walking. Palpitations have improved with weight loss. She takes only 1/2 tab coreg 3.125 at bedtime. She does not wish to start flecainide at this time as symptoms improved. Lipids 2/12/24 LDL increased slightly from 89 to 110. In 6/2023, CMP stable, GFR 97, H/H14.6/45, mag 1.7, TSH 1.57.  Magnesium 400 mg daily added.         Cardiac History:    Past Surgical History:   Procedure Laterality Date    CARDIOVASCULAR STRESS TEST  11/17/2015    R.Stress- 5 Min. 74% THR. BP- 156/80. Inconclusive    CARDIOVASCULAR STRESS TEST  06/13/2023    R.Stress. 1.33 Min. 4.6 METS.73% THR. 186/58. Inconclusive    CARDIOVASCULAR STRESS TEST  08/28/2023    (Mod) 5.40 Min. 3.4 METS. 73% THR. 191/84. EF > 70%. R/O Breast attenuation    CONVERTED (HISTORICAL) HOLTER  07/15/2023    < 3 Days. Avg 58. 41-89. 8.1% PAC. 16 SVT.    CONVERTED (HISTORICAL) HOLTER   02/24/2024    < 3 Days. Avg 59. 7.6% PAC. 1.3% PVC. 65 SVT    ECHO - CONVERTED  12/08/2021    TLS.EF 60%. Trace -Mild MR. RVSP- 21 mmHg    ECHO - CONVERTED  06/06/2023    TLS. EF 60%. LA-4.2. Trace-Mild MR. RVSP- 29 mmHg    OTHER SURGICAL HISTORY  12/04/2021    Event monitor- 1 day. Avg 63. . 6% PAC. 1 % PVC     Current Outpatient Medications   Medication Sig Dispense Refill    12 Hour Nasal Spray 0.05 % nasal spray 2 sprays Daily.      docusate sodium (COLACE) 100 MG capsule Take 1 capsule by mouth Every Night.      docusate sodium (COLACE) 250 MG capsule Take 1 capsule by mouth Daily.      enalapril (VASOTEC) 20 MG tablet Take 1 tablet by mouth 2 (Two) Times a Day. 180 tablet 3    fluticasone (FLONASE) 50 MCG/ACT nasal spray 1 spray into the nostril(s) as directed by provider Daily.      furosemide (LASIX) 20 MG tablet Take 1 tablet by mouth Daily. Takes 1/2 tablet PRN (Patient taking differently: Take 0.5 tablets by mouth Daily As Needed.) 90 tablet 3    gabapentin (NEURONTIN) 300 MG capsule Take 1 capsule by mouth Daily As Needed.      HYDROcodone-acetaminophen (NORCO)  MG per tablet Take 1 tablet by mouth 3 (Three) Times a Day As Needed for Moderate Pain.      Linzess 145 MCG capsule capsule 1 capsule Daily.      loratadine (CLARITIN) 10 MG tablet Take 1 tablet by mouth Daily.      Omega-3 Fatty Acids (fish oil) 1000 MG capsule capsule Take 1 capsule by mouth 2 (Two) Times a Day With Meals.      pantoprazole (PROTONIX) 40 MG EC tablet Take 1 tablet by mouth Daily.      potassium chloride (K-DUR,KLOR-CON) 20 MEQ CR tablet Daily As Needed.      triamterene-hydrochlorothiazide (MAXZIDE) 75-50 MG per tablet Take 1 tablet by mouth Daily. 90 tablet 3     No current facility-administered medications for this visit.     Amlodipine, Azithromycin, Bactrim [sulfamethoxazole-trimethoprim], Cephalexin, Diltiazem, Other, Penicillins, Sulfa antibiotics, and Trimethoprim    Past Medical History:   Diagnosis Date     Anxiety     panic attacks    Cancer     uterine    Chronic back pain     Depression     Diabetes mellitus     GERD (gastroesophageal reflux disease)     GERD (gastroesophageal reflux disease)     H/O hernia repair     H/O: hysterectomy     Heart murmur     History of cholecystectomy     Hx of appendectomy     Hypertension      Social History     Socioeconomic History    Marital status:    Tobacco Use    Smoking status: Former     Current packs/day: 0.00     Average packs/day: 1 pack/day for 20.0 years (20.0 ttl pk-yrs)     Types: Cigarettes     Start date:      Quit date:      Years since quittin.2     Passive exposure: Current    Smokeless tobacco: Never    Tobacco comments:     Quit 2006   Vaping Use    Vaping status: Never Used   Substance and Sexual Activity    Alcohol use: No    Drug use: Never    Sexual activity: Defer     Family History   Problem Relation Age of Onset    Heart attack Mother         multiple. First in 40's    Hypertension Mother     Diabetes Mother     Cancer Mother     Heart disease Mother     No Known Problems Father     Heart disease Other         palpatations    Hypertension Sister     Heart attack Sister     Kidney disease Sister      Review of Systems   Constitutional: Positive for weight loss (-54 in one year). Negative for decreased appetite and malaise/fatigue.   HENT: Negative.     Eyes:  Negative for blurred vision.   Cardiovascular:  Positive for palpitations (improved). Negative for chest pain, dyspnea on exertion, leg swelling and syncope.   Respiratory:  Negative for shortness of breath and sleep disturbances due to breathing.    Endocrine: Negative.    Hematologic/Lymphatic: Negative for bleeding problem. Does not bruise/bleed easily.   Skin: Negative.    Musculoskeletal:  Negative for falls and myalgias.   Gastrointestinal:  Negative for abdominal pain, heartburn and melena.   Genitourinary:  Negative for hematuria.   Neurological:  Negative for dizziness  "and light-headedness.   Psychiatric/Behavioral:  Negative for altered mental status.    Allergic/Immunologic: Negative.       Objective     /78 (BP Location: Left arm)   Pulse 64   Ht 167 cm (65.75\")   Wt 88.6 kg (195 lb 6.4 oz)   BMI 31.78 kg/m²     Vitals and nursing note reviewed.   Constitutional:       General: Not in acute distress.     Appearance: Well-developed. Not diaphoretic.   Eyes:      Pupils: Pupils are equal, round, and reactive to light.   HENT:      Head: Normocephalic.   Pulmonary:      Effort: Pulmonary effort is normal. No respiratory distress.      Breath sounds: Normal breath sounds.   Cardiovascular:      Normal rate. Regular rhythm.   Pulses:     Intact distal pulses.   Edema:     Peripheral edema absent.   Abdominal:      General: Bowel sounds are normal.      Palpations: Abdomen is soft.   Musculoskeletal: Normal range of motion.      Cervical back: Normal range of motion. Skin:     General: Skin is warm and dry.   Neurological:      Mental Status: Alert and oriented to person, place, and time.        Procedures          Problem List Items Addressed This Visit          Cardiac and Vasculature    Primary hypertension - Primary    Relevant Medications    triamterene-hydrochlorothiazide (MAXZIDE) 75-50 MG per tablet    enalapril (VASOTEC) 20 MG tablet    Palpitations    Premature atrial contractions    PVC (premature ventricular contraction)    Paroxysmal SVT (supraventricular tachycardia)    Hypercholesterolemia      HTN  -well controlled  -continue enalapril, triamterene/hctz    SVT/PAC/PVC  -holter reviewed, 7% PAC, 65 SVT  -explained to pt these are not life threatening but may cause significant symptoms  -she declines anti-arrhythmic, declines flecainide  -continue low dose coreg  -TSH and mag stable    Hypercholesterolemia  -LDL increased from 89 to 110, normal HDL and trigs  -encouraged to continue heart healthy diet  -continue fish oil    She will return in approximately 3 " months for BP and HR check as she may be developing SSS/tachybrady syndrome.     FU 6 months.               Electronically signed by MONIQUE Orona,  March 13, 2024 09:57 EDT

## 2024-03-13 PROBLEM — I47.10 PAROXYSMAL SVT (SUPRAVENTRICULAR TACHYCARDIA): Status: ACTIVE | Noted: 2024-03-13

## 2024-03-13 PROBLEM — E78.00 HYPERCHOLESTEROLEMIA: Status: ACTIVE | Noted: 2024-03-13

## 2024-04-30 ENCOUNTER — OFFICE (OUTPATIENT)
Dept: URBAN - METROPOLITAN AREA CLINIC 76 | Facility: CLINIC | Age: 64
End: 2024-04-30

## 2024-04-30 VITALS
HEART RATE: 84 BPM | OXYGEN SATURATION: 97 % | WEIGHT: 195 LBS | SYSTOLIC BLOOD PRESSURE: 130 MMHG | DIASTOLIC BLOOD PRESSURE: 70 MMHG | HEIGHT: 65 IN

## 2024-04-30 DIAGNOSIS — K59.03 DRUG INDUCED CONSTIPATION: ICD-10-CM

## 2024-04-30 DIAGNOSIS — K64.8 OTHER HEMORRHOIDS: ICD-10-CM

## 2024-04-30 PROCEDURE — 99204 OFFICE O/P NEW MOD 45 MIN: CPT | Performed by: NURSE PRACTITIONER

## 2024-04-30 RX ORDER — HYDROCORTISONE ACETATE 25 MG/1
SUPPOSITORY RECTAL
Qty: 12 | Refills: 4 | Status: COMPLETED
Start: 2024-04-30 | End: 2024-07-25

## 2024-05-08 ENCOUNTER — TELEPHONE (OUTPATIENT)
Dept: CARDIOLOGY | Facility: CLINIC | Age: 64
End: 2024-05-08
Payer: MEDICARE

## 2024-05-23 ENCOUNTER — OFFICE (OUTPATIENT)
Dept: URBAN - METROPOLITAN AREA CLINIC 76 | Facility: CLINIC | Age: 64
End: 2024-05-23
Payer: COMMERCIAL

## 2024-05-23 VITALS — HEIGHT: 65 IN

## 2024-05-23 DIAGNOSIS — K64.2 THIRD DEGREE HEMORRHOIDS: ICD-10-CM

## 2024-05-23 PROCEDURE — 46221 LIGATION OF HEMORRHOID(S): CPT | Performed by: INTERNAL MEDICINE

## 2024-05-23 NOTE — SERVICEHPINOTES
64-year-old female presents today for evaluation of constipation and hemorrhoids.She tells me that she has seen a previous GI physician in [facility] but they "would not help my hemorrhoids."She has a history of drug-induced constipation and is managed on Linzess. She tells me that this works well for her and controls her symptoms. She has mostly normal formed stools. She does have a lot of issues with hemorrhoids however which is her main reason for being here. She tells me that sometimes they will have a small amount of bright red blood on the right being but denies blood in the stool or in the toilet. Not very painful but she feels them protrude and they just bother her. She is asking to have something done that does not require sedation. She has tried hydrocortisone ointment without much relief.She has not had a colonoscopy as she is adamantly against this. She tells me that she is very hesitant to undergo any type of sedation. She reports previous colon cancer screening through [facility] but I do not have these results.She declines rectal exam today and tells me that her wife has visualized them, her PCP saw them and so did previous GI and they "all said it was prolapsed hemorrhoids."

## 2024-06-12 ENCOUNTER — TELEPHONE (OUTPATIENT)
Dept: CARDIOLOGY | Facility: CLINIC | Age: 64
End: 2024-06-12
Payer: MEDICARE

## 2024-06-12 RX ORDER — LANOLIN ALCOHOL/MO/W.PET/CERES
1 CREAM (GRAM) TOPICAL DAILY
Qty: 90 TABLET | Refills: 2 | OUTPATIENT
Start: 2024-06-12

## 2024-06-12 NOTE — TELEPHONE ENCOUNTER
Patient called reporting she had BP check today at PCP office. /64 with HR 58. PCP told her if we needed any information to contact office.    She was to have BP check here today, went to PCP office.

## 2024-06-21 ENCOUNTER — TELEPHONE (OUTPATIENT)
Dept: CARDIOLOGY | Facility: CLINIC | Age: 64
End: 2024-06-21
Payer: MEDICARE

## 2024-06-21 NOTE — TELEPHONE ENCOUNTER
Spoke with patient concerning symptoms, she reports when working outside with [employer] jobs she has noticed that she can hear heart beating in ears and has jumping feeling in chest. Symptoms are relieved with rest. Patient has not checked BP with symptoms. At rest she has noticed /78, 120/62. She has only been drinking water.     Advised patient if working outside in heat she does need some fluid with electrolytes along with water. Also advised to monitor BP and HR with symptoms. Patient voiced understanding and aware if having increased symptoms and weakness over weekend to go to ER.

## 2024-06-21 NOTE — TELEPHONE ENCOUNTER
Caller: Brianna Escalera    Relationship: Self    Best call back number: [phone] (home) PT SAID CAN RECEIVE A TEXT MESSAGE    What is the best time to reach you: ANYTIME    Who are you requesting to speak with (clinical staff, provider,  specific staff member): CLINICAL      What was the call regarding: PT SAYS PT HAS BEEN HEARING HER HEAR BEAT IN  HER EARS WHEN SHE'S ACTIVE. THIS IS NOT HAPPENING NOW BUT WHEN THE PT IS ACTIVE DOING SOMETHING. PT WOULD LIKE A CALLBACK TO DISCUSS THIS, PLEASE.    Is it okay if the provider responds through [facility]: YES

## 2024-06-24 NOTE — TELEPHONE ENCOUNTER
Patient made aware vital looked good, symptoms could be related to sinuses, fluid in her ears. Recommended to try taking antihistamine, cetirizine 10 mg daily, understanding voiced.

## 2024-06-24 NOTE — TELEPHONE ENCOUNTER
Vitals look good.     Could be sinuses, fluid in her ears.    Try taking antihistamine, cetirizine 10 mg daily

## 2024-06-24 NOTE — TELEPHONE ENCOUNTER
Attempted to contact patient with recommendations, unable to reach, and unable to leave message due to no voice mail.

## 2024-06-25 ENCOUNTER — TELEPHONE (OUTPATIENT)
Dept: CARDIOLOGY | Facility: CLINIC | Age: 64
End: 2024-06-25
Payer: MEDICARE

## 2024-06-25 RX ORDER — FLECAINIDE ACETATE 50 MG/1
50 TABLET ORAL 2 TIMES DAILY
Qty: 60 TABLET | Refills: 11 | Status: SHIPPED | OUTPATIENT
Start: 2024-06-25

## 2024-06-25 NOTE — TELEPHONE ENCOUNTER
Patient made aware of recommendations for flecainide 50 mg bid. Patient wishes to start medication on 7/1/24 and EKG scheduled to for 7/3/24 at 3 pm, understanding voiced.

## 2024-06-25 NOTE — TELEPHONE ENCOUNTER
Per holter monitor in February, pt was to consider flecainide for palpitations.  Pt declined at OV in March.  How would you like to proceed?

## 2024-06-25 NOTE — TELEPHONE ENCOUNTER
Caller: [facility]    Relationship to patient: PCP    Best call back number: PLEASE CALL PATIENT WITH APPOINTMENT    Chief complaint: PALPITATIONS    Type of visit: FOLLOW UP    Requested date: ASAP     If rescheduling, when is the original appointment: N/A     Additional notes:PCP CALLING TO REQUEST A SOONER APPOINTMENT WITH TERRA DUE TO PALPITATIONS.

## 2024-06-25 NOTE — TELEPHONE ENCOUNTER
Spoke with patient concerning Flecainide. Patient reports she saw PCP today, reports labs are all good, and she had them check her ears with no fluid notice. She is willing to start Flecainide yet has appointment later this week in [place], will not be able to start medication until Monday 7/1/24.

## 2024-06-27 ENCOUNTER — OFFICE (OUTPATIENT)
Dept: URBAN - METROPOLITAN AREA CLINIC 76 | Facility: CLINIC | Age: 64
End: 2024-06-27
Payer: COMMERCIAL

## 2024-06-27 VITALS — HEIGHT: 65 IN

## 2024-06-27 DIAGNOSIS — K64.1 SECOND DEGREE HEMORRHOIDS: ICD-10-CM

## 2024-06-27 PROCEDURE — 46221 LIGATION OF HEMORRHOID(S): CPT | Performed by: INTERNAL MEDICINE

## 2024-06-27 NOTE — SERVICEHPINOTES
64-year-old female presents today for evaluation of constipation and hemorrhoids.She tells me that she has seen a previous GI physician in [facility] but they "would not help my hemorrhoids."She has a history of drug-induced constipation and is managed on Linzess. She tells me that this works well for her and controls her symptoms. She has mostly normal formed stools. She does have a lot of issues with hemorrhoids however which is her main reason for being here. She tells me that sometimes they will have a small amount of bright red blood on the right being but denies blood in the stool or in the toilet. Not very painful but she feels them protrude and they just bother her. She is asking to have something done that does not require sedation. She has tried hydrocortisone ointment without much relief.She has not had a colonoscopy as she is adamantly against this. She tells me that she is very hesitant to undergo any type of sedation. She reports previous colon cancer screening through [facility] but I do not have these results.She declines rectal exam today and tells me that her wife has visualized them, her PCP saw them and so did previous GI and they "all said it was prolapsed hemorrhoids."

## 2024-07-03 ENCOUNTER — CLINICAL SUPPORT (OUTPATIENT)
Dept: CARDIOLOGY | Facility: CLINIC | Age: 64
End: 2024-07-03
Payer: MEDICARE

## 2024-07-03 ENCOUNTER — TELEPHONE (OUTPATIENT)
Dept: CARDIOLOGY | Facility: CLINIC | Age: 64
End: 2024-07-03

## 2024-07-03 DIAGNOSIS — I47.10 PAROXYSMAL SVT (SUPRAVENTRICULAR TACHYCARDIA): ICD-10-CM

## 2024-07-03 DIAGNOSIS — I49.3 PVC (PREMATURE VENTRICULAR CONTRACTION): Primary | ICD-10-CM

## 2024-07-03 DIAGNOSIS — Z79.899 MEDICATION MANAGEMENT: ICD-10-CM

## 2024-07-03 DIAGNOSIS — I49.1 PREMATURE ATRIAL CONTRACTIONS: ICD-10-CM

## 2024-07-03 PROCEDURE — 93000 ELECTROCARDIOGRAM COMPLETE: CPT | Performed by: NURSE PRACTITIONER

## 2024-07-03 NOTE — PROGRESS NOTES
Procedure     ECG 12 Lead    Date/Time: 7/3/2024 4:34 PM  Performed by: Savanna Mckeon APRN    Authorized by: Savanna Mckeon APRN  Comparison: compared with previous ECG from 2/15/2023  Comparison to previous ECG: Previous EKG with 1 PAC and 1 PVC, current NSR  Rhythm: sinus rhythm  BPM: 69

## 2024-07-03 NOTE — TELEPHONE ENCOUNTER
EKG NSR. Pt admits took Flecainide earlier than instructed then stopped 2 days ago. If she wants to resume the medication she will need to return for EKG in 2 days after starting the medication.

## 2024-07-03 NOTE — TELEPHONE ENCOUNTER
Patient was made aware that EKG showed NSR. Patient was made aware that if she wants to take the flecainide to let us know so that we can schedule EKG within 2 days of her starting.

## 2024-07-18 ENCOUNTER — OFFICE (OUTPATIENT)
Dept: URBAN - METROPOLITAN AREA CLINIC 76 | Facility: CLINIC | Age: 64
End: 2024-07-18
Payer: COMMERCIAL

## 2024-07-18 VITALS — HEIGHT: 65 IN

## 2024-07-18 DIAGNOSIS — K64.2 THIRD DEGREE HEMORRHOIDS: ICD-10-CM

## 2024-07-18 PROCEDURE — 46221 LIGATION OF HEMORRHOID(S): CPT | Performed by: INTERNAL MEDICINE

## 2024-07-18 NOTE — SERVICEHPINOTES
64-year-old female presents today for evaluation of constipation and hemorrhoids.She tells me that she has seen a previous GI physician in [facility] but they "would not help my hemorrhoids."She has a history of drug-induced constipation and is managed on Linzess. She tells me that this works well for her and controls her symptoms. She has mostly normal formed stools. She does have a lot of issues with hemorrhoids however which is her main reason for being here. She tells me that sometimes they will have a small amount of bright red blood on the right being but denies blood in the stool or in the toilet. Not very painful but she feels them protrude and they just bother her. She is asking to have something done that does not require sedation. She has tried hydrocortisone ointment without much relief.She has not had a colonoscopy as she is adamantly against this. She tells me that she is very hesitant to undergo any type of sedation. She reports previous colon cancer screening through [facility] but I do not have these results.She declines rectal exam today and tells me that her wife has visualized them, her PCP saw them and so did previous GI and they "all said it was prolapsed hemorrhoids."

## 2024-07-25 ENCOUNTER — OFFICE (OUTPATIENT)
Dept: URBAN - METROPOLITAN AREA CLINIC 94 | Facility: CLINIC | Age: 64
End: 2024-07-25
Payer: MEDICARE

## 2024-07-25 VITALS
HEART RATE: 80 BPM | DIASTOLIC BLOOD PRESSURE: 75 MMHG | HEIGHT: 65 IN | SYSTOLIC BLOOD PRESSURE: 132 MMHG | WEIGHT: 198 LBS

## 2024-07-25 DIAGNOSIS — K59.03 DRUG INDUCED CONSTIPATION: ICD-10-CM

## 2024-07-25 DIAGNOSIS — K92.1 MELENA: ICD-10-CM

## 2024-07-25 DIAGNOSIS — K64.2 THIRD DEGREE HEMORRHOIDS: ICD-10-CM

## 2024-07-25 PROCEDURE — 99214 OFFICE O/P EST MOD 30 MIN: CPT | Performed by: INTERNAL MEDICINE

## 2024-08-05 ENCOUNTER — TELEPHONE (OUTPATIENT)
Dept: CARDIOLOGY | Facility: CLINIC | Age: 64
End: 2024-08-05
Payer: MEDICARE

## 2024-08-05 RX ORDER — CARVEDILOL 3.12 MG/1
TABLET ORAL
Qty: 60 TABLET | Refills: 11 | Status: SHIPPED | OUTPATIENT
Start: 2024-08-05

## 2024-08-05 NOTE — TELEPHONE ENCOUNTER
Patient called to relay that their heart rate spiked up to 138 and while on call patient's heart rate was 100. Patient had blood pressure checked at family doctor today which was 152/92. They did have a panic attack today and relayed that's when it spiked up to 138 and now has been staying in th 's. Patient also relayed they have been having shaking with heart rate being high. Patient would like to know if it would be okay to restart taking half of a coreg, last script was 3.125 mg.

## 2024-08-05 NOTE — TELEPHONE ENCOUNTER
Patient states they have never taking the flecainide and does not wish to begin the medication at this time.

## 2024-08-20 ENCOUNTER — TELEPHONE (OUTPATIENT)
Dept: CARDIOLOGY | Facility: CLINIC | Age: 64
End: 2024-08-20
Payer: MEDICARE

## 2024-08-20 NOTE — TELEPHONE ENCOUNTER
Called the office saying that the patient was being released from the hosp and needed a 2-3 week f/u .  It does not look like the patient was seen by us or had testing read.  Please pull records and review.  Thanks,

## 2024-08-20 NOTE — TELEPHONE ENCOUNTER
Patient made aware of recommendations to stop bisoprolol, recommend flecainide as discussed before and need to continue eliquis. Aware will need EKG 2 days after starting Flecainide. Patient voiced understanding and scheduled for EKG on 8/23/24 at 10 am.

## 2024-08-20 NOTE — TELEPHONE ENCOUNTER
She was in AF with RVR, converted spontaneously after IV Cardizem.     Discharged with bisoprolol and Eliquis.     Recommend flecainide as discussed before. Continue Eliquis.

## 2024-08-20 NOTE — TELEPHONE ENCOUNTER
Patient would like a call from a nurse today.  She states the she just got out of the hospital and the physician suggested she takes Eliquis but has not started it yet. She is afraid to take any blood thinner until she has surgery.  Please call and advise when you get a chance.  Thank you in advance.

## 2024-08-20 NOTE — TELEPHONE ENCOUNTER
PATIENT IS HAVING SIDE EFFECTS TO THE MEDICATION GIVEN IN THE HOSPITAL. WHICH IS BISOPROLOL. CAUSING ISSUES WITH THEIR VISION.

## 2024-08-21 ENCOUNTER — CLINICAL SUPPORT (OUTPATIENT)
Dept: CARDIOLOGY | Facility: CLINIC | Age: 64
End: 2024-08-21
Payer: MEDICARE

## 2024-08-21 DIAGNOSIS — I47.10 PAROXYSMAL SVT (SUPRAVENTRICULAR TACHYCARDIA): ICD-10-CM

## 2024-08-21 DIAGNOSIS — I49.3 PVC (PREMATURE VENTRICULAR CONTRACTION): Primary | ICD-10-CM

## 2024-08-21 DIAGNOSIS — I48.0 PAF (PAROXYSMAL ATRIAL FIBRILLATION): ICD-10-CM

## 2024-08-22 ENCOUNTER — OFFICE (OUTPATIENT)
Age: 64
End: 2024-08-22
Payer: COMMERCIAL

## 2024-08-22 ENCOUNTER — OFFICE (OUTPATIENT)
Dept: URBAN - METROPOLITAN AREA CLINIC 76 | Facility: CLINIC | Age: 64
End: 2024-08-22
Payer: COMMERCIAL

## 2024-08-22 VITALS — HEIGHT: 65 IN | HEIGHT: 65 IN | HEIGHT: 65 IN | HEIGHT: 65 IN | HEIGHT: 65 IN | HEIGHT: 65 IN | HEIGHT: 65 IN

## 2024-08-22 DIAGNOSIS — K64.2 THIRD DEGREE HEMORRHOIDS: ICD-10-CM

## 2024-08-22 PROCEDURE — 46221 LIGATION OF HEMORRHOID(S): CPT | Performed by: INTERNAL MEDICINE

## 2024-08-22 NOTE — TELEPHONE ENCOUNTER
Discussed medication in detail with Brianna. She agrees to continue flecainide and Eliquis.     Will come Friday morning for EKG.

## 2024-08-23 ENCOUNTER — CLINICAL SUPPORT (OUTPATIENT)
Dept: CARDIOLOGY | Facility: CLINIC | Age: 64
End: 2024-08-23
Payer: MEDICARE

## 2024-08-23 DIAGNOSIS — I49.3 PVC (PREMATURE VENTRICULAR CONTRACTION): ICD-10-CM

## 2024-08-23 DIAGNOSIS — I48.0 PAF (PAROXYSMAL ATRIAL FIBRILLATION): Primary | ICD-10-CM

## 2024-08-26 ENCOUNTER — TELEPHONE (OUTPATIENT)
Dept: CARDIOLOGY | Facility: CLINIC | Age: 64
End: 2024-08-26

## 2024-08-26 PROCEDURE — 93000 ELECTROCARDIOGRAM COMPLETE: CPT | Performed by: NURSE PRACTITIONER

## 2024-08-26 NOTE — PROGRESS NOTES
Procedure     ECG 12 Lead    Date/Time: 8/26/2024 10:47 AM  Performed by: Ramandeep Cline APRN    Authorized by: Ramandeep Cline APRN  Comparison: compared with previous ECG   Similar to previous ECG  Rhythm: sinus rhythm  Rate: normal  BPM: 70  ST Segments: ST segments normal    Clinical impression: normal ECG  Comments: QTc 417 ms

## 2024-08-26 NOTE — TELEPHONE ENCOUNTER
Patient came by this morning to let us know that her heart in the morning is sometimes going boom, boom, boom real fast and then gets better her heart rate Saturday was 117 and she went for a ride and talked and it went down.  She is aware we will call after clinic.

## 2024-08-26 NOTE — TELEPHONE ENCOUNTER
Patient made aware of EKG results and recommendations to continue Flecainide. Patient reports on Saturday morning she woke up worrying about not being able to take eliquis due to having recent surgery and then heart rate went to 117. After going for ride and talking her heart rate decreased to normal. She has had no further elevated heart rate. Advised elevated heart rate could have been related to anxiety. Patient to continue to monitor heart rate and reports she will call office back if has any further problems.

## 2024-08-27 PROCEDURE — 93000 ELECTROCARDIOGRAM COMPLETE: CPT | Performed by: NURSE PRACTITIONER

## 2024-08-27 NOTE — PROGRESS NOTES
Procedure     ECG 12 Lead    Date/Time: 8/27/2024 11:45 AM  Performed by: Ramandeep Cline APRN    Authorized by: Ramandeep Cline APRN  Comparison: compared with previous ECG   Similar to previous ECG  Rhythm: sinus rhythm  Rate: normal  BPM: 90  ST Segments: ST segments normal    Clinical impression: normal ECG  Comments: QTc 414 ms

## 2024-08-29 ENCOUNTER — TELEPHONE (OUTPATIENT)
Dept: CARDIOLOGY | Facility: CLINIC | Age: 64
End: 2024-08-29
Payer: MEDICARE

## 2024-08-29 NOTE — TELEPHONE ENCOUNTER
"Spoke with patient concerning heart rate. She reports heart rate will elevate when she first jumps up in the mornings for a few seconds and then goes back to normal. Also noted elevated heart rate for a few seconds today after eating. She reports heart rate does not stay elevated and she does not notice any abnormal beats. Advised patient that sometimes heart rate will normally elevate if you move quickly and with activity. She reports heart rate has been going back to normal within a few seconds, she states \"it just makes me nervous because I am afraid it will get high again\". Patient to continue to monitor.  "

## 2024-09-03 ENCOUNTER — TELEPHONE (OUTPATIENT)
Dept: CARDIOLOGY | Facility: CLINIC | Age: 64
End: 2024-09-03
Payer: MEDICARE

## 2024-09-03 NOTE — TELEPHONE ENCOUNTER
Patient called concerning palpitations. She reports every morning for the last 3 mornings when she sits up, she will have palpitations for 1-2 minutes. Will have no other palpitations after the morning.     She went to see PCP this morning and they obtained labs then started Buspar.

## 2024-09-03 NOTE — TELEPHONE ENCOUNTER
Patient made aware to continue flecainide, medication is helping and agree with buspar. Patient voiced understanding.

## 2024-09-03 NOTE — TELEPHONE ENCOUNTER
Okay, ask her to continue flecainide, recent holter showed PAC burden improved to 5%, brief atrial tach, longest episode 9 seconds. Medicine is helping.    This is good. Agree with buspar

## 2024-09-16 ENCOUNTER — TELEPHONE (OUTPATIENT)
Dept: CARDIOLOGY | Facility: CLINIC | Age: 64
End: 2024-09-16
Payer: MEDICARE

## 2024-09-18 ENCOUNTER — OFFICE VISIT (OUTPATIENT)
Dept: CARDIOLOGY | Facility: CLINIC | Age: 64
End: 2024-09-18
Payer: MEDICARE

## 2024-09-18 VITALS
DIASTOLIC BLOOD PRESSURE: 68 MMHG | HEART RATE: 68 BPM | BODY MASS INDEX: 32.05 KG/M2 | SYSTOLIC BLOOD PRESSURE: 128 MMHG | WEIGHT: 199.4 LBS | HEIGHT: 66 IN

## 2024-09-18 DIAGNOSIS — I47.10 PAROXYSMAL SVT (SUPRAVENTRICULAR TACHYCARDIA): ICD-10-CM

## 2024-09-18 DIAGNOSIS — E78.00 HYPERCHOLESTEROLEMIA: ICD-10-CM

## 2024-09-18 DIAGNOSIS — I10 PRIMARY HYPERTENSION: ICD-10-CM

## 2024-09-18 DIAGNOSIS — R00.2 PALPITATIONS: ICD-10-CM

## 2024-09-18 DIAGNOSIS — I49.3 PVC (PREMATURE VENTRICULAR CONTRACTION): ICD-10-CM

## 2024-09-18 DIAGNOSIS — G47.34 NOCTURNAL HYPOXIA: Primary | ICD-10-CM

## 2024-09-18 DIAGNOSIS — I48.0 PAF (PAROXYSMAL ATRIAL FIBRILLATION): ICD-10-CM

## 2024-09-18 DIAGNOSIS — I49.1 PREMATURE ATRIAL CONTRACTIONS: ICD-10-CM

## 2024-09-18 PROCEDURE — 1160F RVW MEDS BY RX/DR IN RCRD: CPT | Performed by: NURSE PRACTITIONER

## 2024-09-18 PROCEDURE — 99214 OFFICE O/P EST MOD 30 MIN: CPT | Performed by: NURSE PRACTITIONER

## 2024-09-18 PROCEDURE — 3074F SYST BP LT 130 MM HG: CPT | Performed by: NURSE PRACTITIONER

## 2024-09-18 PROCEDURE — 1159F MED LIST DOCD IN RCRD: CPT | Performed by: NURSE PRACTITIONER

## 2024-09-18 PROCEDURE — 3078F DIAST BP <80 MM HG: CPT | Performed by: NURSE PRACTITIONER

## 2024-09-18 RX ORDER — TRIAMTERENE AND HYDROCHLOROTHIAZIDE 75; 50 MG/1; MG/1
1 TABLET ORAL DAILY
Qty: 90 TABLET | Refills: 3 | Status: SHIPPED | OUTPATIENT
Start: 2024-09-18

## 2024-09-18 RX ORDER — ENALAPRIL MALEATE 20 MG/1
20 TABLET ORAL 2 TIMES DAILY
Qty: 180 TABLET | Refills: 3 | Status: SHIPPED | OUTPATIENT
Start: 2024-09-18

## 2024-09-19 ENCOUNTER — TELEPHONE (OUTPATIENT)
Dept: CARDIOLOGY | Facility: CLINIC | Age: 64
End: 2024-09-19
Payer: MEDICARE

## 2024-09-19 RX ORDER — DICYCLOMINE HYDROCHLORIDE 10 MG/1
10 CAPSULE ORAL
Qty: 60 CAPSULE | Refills: 8 | Status: SHIPPED | OUTPATIENT
Start: 2024-09-19

## 2024-09-20 PROBLEM — I48.0 PAF (PAROXYSMAL ATRIAL FIBRILLATION): Status: ACTIVE | Noted: 2024-09-20

## 2024-09-20 PROCEDURE — 93000 ELECTROCARDIOGRAM COMPLETE: CPT | Performed by: NURSE PRACTITIONER

## 2024-09-25 ENCOUNTER — TELEPHONE (OUTPATIENT)
Dept: CARDIOLOGY | Facility: CLINIC | Age: 64
End: 2024-09-25
Payer: MEDICARE

## 2024-09-26 ENCOUNTER — CLINICAL SUPPORT (OUTPATIENT)
Dept: CARDIOLOGY | Facility: CLINIC | Age: 64
End: 2024-09-26
Payer: MEDICARE

## 2024-09-26 DIAGNOSIS — I47.10 PAROXYSMAL SVT (SUPRAVENTRICULAR TACHYCARDIA): ICD-10-CM

## 2024-09-26 DIAGNOSIS — I49.1 PREMATURE ATRIAL CONTRACTIONS: ICD-10-CM

## 2024-09-26 DIAGNOSIS — I49.3 PVC (PREMATURE VENTRICULAR CONTRACTION): ICD-10-CM

## 2024-09-26 DIAGNOSIS — I48.0 PAF (PAROXYSMAL ATRIAL FIBRILLATION): Primary | ICD-10-CM

## 2024-09-30 ENCOUNTER — TELEPHONE (OUTPATIENT)
Dept: CARDIOLOGY | Facility: CLINIC | Age: 64
End: 2024-09-30

## 2024-09-30 PROCEDURE — 93000 ELECTROCARDIOGRAM COMPLETE: CPT | Performed by: NURSE PRACTITIONER

## 2024-09-30 NOTE — PROGRESS NOTES
Procedure     ECG 12 Lead    Date/Time: 9/30/2024 11:40 AM  Performed by: Ramandeep Cline APRN    Authorized by: Ramandeep Cline APRN  Comparison: compared with previous ECG   Similar to previous ECG  Rhythm: sinus rhythm  Rate: normal  BPM: 68  ST Segments: ST segments normal    Clinical impression: normal ECG  Comments:  ms  QRS 95 ms  QTc 337 ms

## 2024-09-30 NOTE — TELEPHONE ENCOUNTER
Can we let Kavya know her EKG is normal and her blood pressure is normal.    Continue flecainide 75 mg twice daily

## 2024-10-01 NOTE — TELEPHONE ENCOUNTER
Pt was already advised on Thursday of results and to continue current dose of flecainide 75 mg twice a day. She voiced understanding.

## 2024-10-03 ENCOUNTER — TELEPHONE (OUTPATIENT)
Dept: CARDIOLOGY | Facility: CLINIC | Age: 64
End: 2024-10-03
Payer: MEDICARE

## 2024-10-03 DIAGNOSIS — R00.2 PALPITATIONS: ICD-10-CM

## 2024-10-03 DIAGNOSIS — I49.3 PVC (PREMATURE VENTRICULAR CONTRACTION): ICD-10-CM

## 2024-10-03 DIAGNOSIS — I48.0 PAF (PAROXYSMAL ATRIAL FIBRILLATION): Primary | ICD-10-CM

## 2024-10-03 DIAGNOSIS — I47.10 PAROXYSMAL SVT (SUPRAVENTRICULAR TACHYCARDIA): ICD-10-CM

## 2024-10-03 NOTE — TELEPHONE ENCOUNTER
Patient called stating that all day, just when she stands up, her heart feels like it is pounding for a few minutes and then it goes away. HR will be between 80 to 100. Patient was made aware with exertion HR will increase heart rate.     Patient was made aware that you are out of the office today and tomorrow and that we are closed next week. She was made aware that since they are complaints that she has had in the past and that is why we have added the flecainide and now increased the flecainide to 75 mg BID and EKG was ok on Thursday of last week, flecainide stayed the same.     Patient was made aware that if she felt worse to go to the ER. She was made aware that I would let you know and see what you thought we needed to do going forward.     Do you think EP or another monitor or anything like that is appropriate for patient?

## 2024-10-03 NOTE — TELEPHONE ENCOUNTER
Patient was made aware that order for holter was placed and will be mailed to her home to wear for 14 days.

## 2024-10-22 ENCOUNTER — TELEPHONE (OUTPATIENT)
Dept: CARDIOLOGY | Facility: CLINIC | Age: 64
End: 2024-10-22
Payer: MEDICARE

## 2024-10-24 ENCOUNTER — TELEPHONE (OUTPATIENT)
Dept: CARDIOLOGY | Facility: CLINIC | Age: 64
End: 2024-10-24
Payer: MEDICARE

## 2024-10-24 DIAGNOSIS — I48.0 PAF (PAROXYSMAL ATRIAL FIBRILLATION): Primary | ICD-10-CM

## 2024-10-24 DIAGNOSIS — R00.2 PALPITATIONS: ICD-10-CM

## 2024-10-24 DIAGNOSIS — I49.1 PREMATURE ATRIAL CONTRACTIONS: ICD-10-CM

## 2024-10-24 DIAGNOSIS — I47.10 PAROXYSMAL SVT (SUPRAVENTRICULAR TACHYCARDIA): ICD-10-CM

## 2024-10-24 DIAGNOSIS — I49.3 PVC (PREMATURE VENTRICULAR CONTRACTION): ICD-10-CM

## 2024-10-24 NOTE — TELEPHONE ENCOUNTER
Caller: Brianna Escalera    Relationship: Self    Best call back number: [phone]    What is the best time to reach you: ANY    Who are you requesting to speak with (clinical staff, provider,  specific staff member):  CLINICAL      What was the call regarding: PT HAS AN UPPER RESP INFECTION AND IS HIGHLY ALLERGIC TO A LOT OF MEDICATIONS. SHE STATES THE FEW MEDICATIONS SHE CAN TAKE ARE STOPPED BECAUSE OF HER TAKING FLECAINIDE, HER PCP IS WORRIED ABOUT PRESCRIBING HER ANYTHING BECAUSE OF HER MAJOR SENSITIVITY TO MEDS.     SHE IS ASKING IF THERE IS A POSSIBLE ALTERNATIVE TO THE FLECAINIDE THAT SHE CAN TAKE THAT WOULD NOT INTERFERE WITH ANY SORT OF ANTIBIOTICS SHE IS ALLOWED TO HAVE

## 2024-10-25 NOTE — TELEPHONE ENCOUNTER
Patient made aware she can use doxycycline with flecainide. Aware can refer to EP. Aware EP will contact her with appointment, understanding voiced.

## 2024-10-30 PROBLEM — Z79.899 LONG TERM CURRENT USE OF ANTIARRHYTHMIC DRUG: Status: ACTIVE | Noted: 2024-10-30

## 2024-10-30 PROBLEM — R00.2 PALPITATIONS: Status: RESOLVED | Noted: 2021-10-12 | Resolved: 2024-10-30

## 2024-10-30 PROBLEM — E66.2 HYPOVENTILATION ASSOCIATED WITH OBESITY: Status: ACTIVE | Noted: 2024-10-30

## 2024-10-30 PROBLEM — Z79.01 CHRONIC ANTICOAGULATION: Status: ACTIVE | Noted: 2024-10-30

## 2024-10-30 NOTE — PROGRESS NOTES
Cardiac Electrophysiology Outpatient Consult Note            [facility]    Consult Note     Brianna Escalera  [id number]  11/01/2024  [phone]     Primary Care Physician: Quan Del Castillo APRN    Referred By: Ramandeep Cline APRN    Subjective     Chief Complaint:   Diagnoses and all orders for this visit:    1. PAF (paroxysmal atrial fibrillation) (Primary)    2. Gastrointestinal hemorrhage, unspecified gastrointestinal hemorrhage type      Chief Complaint   Patient presents with    PAF       History of Present Illness:   Brianna Escalera is a 64 y.o. female who presents to my electrophysiology clinic for evaluation of as above    Past Medical History:   Patient Active Problem List    Diagnosis Date Noted    Gastrointestinal hemorrhage 11/01/2024    Long term current use of antiarrhythmic drug 10/30/2024    Chronic anticoagulation 10/30/2024    Hypoventilation associated with obesity 10/30/2024    PAF (paroxysmal atrial fibrillation) 09/20/2024     Note Last Updated: 10/30/2024     Echo, 6/6/2023: EF 55-60%.  Left atrium 4.2 cm.  Normal valvular morphology.  Echo, 8/19/2024: EF 60%.  Left atrium 4.4 cm.  Normal valvular morphology      Paroxysmal SVT (supraventricular tachycardia) 03/13/2024     Note Last Updated: 10/30/2024     MPS, 8/28/2023: No reversible ischemia, EF greater than 70%  7-day Holter, 10/3/2024: 6.8% PAC burden.  19 episodes of SVT (atrial tachycardia), all untriggered events.      Hypercholesterolemia 03/13/2024    Premature atrial contractions 04/25/2022     Note Last Updated: 10/30/2024     7-day Holter, 10/3/2024: 6.8% PAC burden.  19 episodes of SVT (atrial tachycardia), all untriggered events.      PVC (premature ventricular contraction) 04/25/2022    Chronic GERD 10/12/2021    Anxiety 10/12/2021    Primary hypertension 10/12/2021       Past Surgical History:   Past Surgical History:   Procedure Laterality Date    CARDIOVASCULAR STRESS TEST   2015    R.Stress- 5 Min. 74% THR. BP- 156/80. Inconclusive    CARDIOVASCULAR STRESS TEST  2023    R.Stress. 1.33 Min. 4.6 METS.73% THR. 186/58. Inconclusive    CARDIOVASCULAR STRESS TEST  2023    (Mod) 5.40 Min. 3.4 METS. 73% THR. 191/84. EF > 70%. R/O Breast attenuation    CONVERTED (HISTORICAL) HOLTER  07/15/2023    < 3 Days. Avg 58. 41-89. 8.1% PAC. 16 SVT.    CONVERTED (HISTORICAL) HOLTER  2024    < 3 Days. Avg 59. 7.6% PAC. 1.3% PVC. 65 SVT    CONVERTED (HISTORICAL) HOLTER  10/21/2024    7 Days. AVG 55. . 6.8% PAC. 19 SVT    ECHO - CONVERTED  2021    TLS.EF 60%. Trace -Mild MR. RVSP- 21 mmHg    ECHO - CONVERTED  2023    TLS. EF 60%. LA-4.2. Trace-Mild MR. RVSP- 29 mmHg    OTHER SURGICAL HISTORY  2021    Event monitor- 1 day. Avg 63. . 6% PAC. 1 % PVC       Social History:   Social History     Socioeconomic History    Marital status:    Tobacco Use    Smoking status: Former     Current packs/day: 0.00     Average packs/day: 1 pack/day for 20.0 years (20.0 ttl pk-yrs)     Types: Cigarettes     Start date: 1986     Quit date: 2006     Years since quittin.8     Passive exposure: Current    Smokeless tobacco: Never    Tobacco comments:     Cant remember   Vaping Use    Vaping status: Never Used   Substance and Sexual Activity    Alcohol use: No    Drug use: Never    Sexual activity: Not Currently     Partners: Female     Birth control/protection: None     Comment: None       Medications:     Current Outpatient Medications:     apixaban (ELIQUIS) 5 MG tablet tablet, Take 1 tablet by mouth 2 (Two) Times a Day., Disp: , Rfl:     Cholecalciferol (Vitamin D3) 25 MCG (1000 UT) capsule, 5 capsules., Disp: , Rfl:     dicyclomine (BENTYL) 10 MG capsule, Take 1 capsule by mouth 4 (Four) Times a Day Before Meals & at Bedtime., Disp: 60 capsule, Rfl: 8    docusate sodium (COLACE) 100 MG capsule, Take 1 capsule by mouth Every Night., Disp: , Rfl:     docusate  sodium (COLACE) 250 MG capsule, Take 1 capsule by mouth Daily., Disp: , Rfl:     enalapril (VASOTEC) 20 MG tablet, Take 1 tablet by mouth 2 (Two) Times a Day., Disp: 180 tablet, Rfl: 3    flecainide (TAMBOCOR) 50 MG tablet, Take 1 tablet by mouth 2 (Two) Times a Day. EKG 48 hrs after starting, Disp: 60 tablet, Rfl: 11    furosemide (LASIX) 20 MG tablet, Take 1 tablet by mouth Daily. Takes 1/2 tablet PRN (Patient taking differently: Take 0.5 tablets by mouth Daily As Needed.), Disp: 90 tablet, Rfl: 3    gabapentin (NEURONTIN) 300 MG capsule, Take 1 capsule by mouth Daily As Needed., Disp: , Rfl:     HYDROcodone-acetaminophen (NORCO)  MG per tablet, Take 1 tablet by mouth 3 (Three) Times a Day As Needed for Moderate Pain., Disp: , Rfl:     Linzess 145 MCG capsule capsule, 1 capsule Daily., Disp: , Rfl:     loratadine (CLARITIN) 10 MG tablet, Take 1 tablet by mouth Daily., Disp: , Rfl:     Omega-3 Fatty Acids (fish oil) 1000 MG capsule capsule, Take 1 capsule by mouth 2 (Two) Times a Day With Meals., Disp: , Rfl:     pantoprazole (PROTONIX) 40 MG EC tablet, Take 1 tablet by mouth Daily., Disp: , Rfl:     potassium chloride (K-DUR,KLOR-CON) 20 MEQ CR tablet, Daily As Needed., Disp: , Rfl:     triamterene-hydrochlorothiazide (MAXZIDE) 75-50 MG per tablet, Take 1 tablet by mouth Daily., Disp: 90 tablet, Rfl: 3    Allergies:   Allergies   Allergen Reactions    Latex Rash    Sulfamethoxazole-Trimethoprim Swelling    Paroxetine Hcl Rash    Penicillin G Rash    Cephalexin Other (See Comments)     Caused problems with kidneys    Ciprofloxacin Other (See Comments)     Can not take due to taking flecainide- causes palpitations    Diltiazem GI Intolerance     Constipation     Other      Monohydrate    Penicillins Dizziness    Sulfa Antibiotics Swelling    Trimethoprim Swelling    Amlodipine Other (See Comments) and Provider Review Needed     Flushing     Other reaction(s): Other (See Comments)    Flushing    Azithromycin  "Other (See Comments) and Rash     Unable to recall reaction       Objective   Vital Signs:   Vitals:    11/01/24 1353   BP: 156/80   BP Location: Right arm   Patient Position: Sitting   Cuff Size: Adult   Pulse: 84   SpO2: 97%   Weight: 90.7 kg (200 lb)   Height: 165.1 cm (65\")       PHYSICAL EXAM    Neck: no adenopathy, no carotid bruit, no JVD, and thyroid: not enlarged  Lungs: clear to auscultation bilaterally and no rhonchi or crackles\", ' symmetric  Heart: regular rate and rhythm, S1, S2 normal, no murmur, click, rub or gallop  Abdomen: Soft, non-tender, bowel sounds normal,  no organomegaly  Extremities: extremities normal, atraumatic, no cyanosis or edema      Lab Results   Component Value Date    GLUCOSE 147 (H) 06/06/2023    CALCIUM 10.0 06/06/2023     (L) 06/06/2023    K 4.0 06/06/2023    CO2 26.4 06/06/2023    CL 94 (L) 06/06/2023    BUN 8 06/06/2023    CREATININE 0.68 06/06/2023    BCR 11.8 06/06/2023    ANIONGAP 14.6 06/06/2023     Lab Results   Component Value Date    WBC 8.99 06/06/2023    HGB 14.6 06/06/2023    HCT 45.0 06/06/2023    MCV 86.5 06/06/2023     06/06/2023     No results found for: \"INR\", \"PROTIME\"  Lab Results   Component Value Date    TSH 1.570 06/06/2023       Cardiac Testing:      I personally viewed and interpreted the patient's EKG/Telemetry/lab data    Procedures    Tobacco Cessation: Cessation Counseling Provided   Obstructive Sleep Apnea Screening: Completed    Assessment & Plan    Diagnoses and all orders for this visit:    1. PAF (paroxysmal atrial fibrillation) (Primary)    2. Gastrointestinal hemorrhage, unspecified gastrointestinal hemorrhage type         Diagnosis Plan   1. PAF (paroxysmal atrial fibrillation)  With symptomatic recurrent episodes of A-fib.    She is not interested in a procedure.  She thinks that the flecainide that she is taking now which is 75 mg twice a day has helped her a significant amount.    Tolerating apixaban 5 mg twice daily well.  " This is the case since she has had her hemorrhoids banded the.    Come back and see me if she decides that her symptoms are worse and she wishes to proceed with an A-fib ablation.  Otherwise she will follow-up with the cardiology team here in [place].      2. Gastrointestinal hemorrhage, unspecified gastrointestinal hemorrhage type  As above        Body mass index is 33.28 kg/m².    I spent 45 minutes in consultation with this patient which included more than 65% of this time in direct face-to-face counseling, physical examination and discussion of my assessment and findings and this shared decision making with the patient.  The remainder of the time not spent face-to-face was performing one, some or all of the following actions: preparing to see the patient (e.g. reviewing tests, prior clinicians' notes, etc), ordering medications, tests or procedures, coordination of care, discussion of the plan with other healthcare providers, documenting clinical information in epic as well as independently interpreting results and communication of these results to the patient family and/or caregiver(s).  Please note that this explicitly excludes time spent on other separate billable services such as performing procedures or test interpretation, when applicable.    Follow Up:       Thank you for allowing me to participate in the care of your patient. Please to not hesitate to contact me with additional questions or concerns.      Rojas Llanos DO, FACC, RS  Cardiac Electrophysiologist  [facility]              Electronically signed by DIANNE Henderson, 10/30/24, 3:21 PM EDT.

## 2024-11-01 ENCOUNTER — OFFICE VISIT (OUTPATIENT)
Dept: CARDIOLOGY | Facility: CLINIC | Age: 64
End: 2024-11-01
Payer: MEDICARE

## 2024-11-01 VITALS
DIASTOLIC BLOOD PRESSURE: 80 MMHG | BODY MASS INDEX: 33.32 KG/M2 | HEIGHT: 65 IN | HEART RATE: 84 BPM | WEIGHT: 200 LBS | OXYGEN SATURATION: 97 % | SYSTOLIC BLOOD PRESSURE: 156 MMHG

## 2024-11-01 DIAGNOSIS — K92.2 GASTROINTESTINAL HEMORRHAGE, UNSPECIFIED GASTROINTESTINAL HEMORRHAGE TYPE: ICD-10-CM

## 2024-11-01 DIAGNOSIS — I48.0 PAF (PAROXYSMAL ATRIAL FIBRILLATION): Primary | ICD-10-CM

## 2024-11-07 ENCOUNTER — TELEPHONE (OUTPATIENT)
Dept: CARDIOLOGY | Facility: CLINIC | Age: 64
End: 2024-11-07
Payer: MEDICARE

## 2024-11-07 DIAGNOSIS — I48.0 PAF (PAROXYSMAL ATRIAL FIBRILLATION): Primary | ICD-10-CM

## 2024-11-07 NOTE — TELEPHONE ENCOUNTER
Patient calls clinic today with concern over recurring episodes of AF. Patient now would like to pursue PVA due to these continuing and worsening episodes. Explained the procedure and answered questions surrounding ablation. Pt agreeable and would like to schedule if ok with Dr. Llanos.

## 2024-11-11 ENCOUNTER — PREP FOR SURGERY (OUTPATIENT)
Dept: OTHER | Facility: HOSPITAL | Age: 64
End: 2024-11-11
Payer: MEDICARE

## 2024-11-11 DIAGNOSIS — I48.0 PAF (PAROXYSMAL ATRIAL FIBRILLATION): Primary | ICD-10-CM

## 2024-11-11 RX ORDER — ONDANSETRON 2 MG/ML
4 INJECTION INTRAMUSCULAR; INTRAVENOUS EVERY 6 HOURS PRN
OUTPATIENT
Start: 2024-11-11

## 2024-11-11 RX ORDER — NITROGLYCERIN 0.4 MG/1
0.4 TABLET SUBLINGUAL
OUTPATIENT
Start: 2024-11-11

## 2024-11-11 RX ORDER — ACETAMINOPHEN 325 MG/1
650 TABLET ORAL EVERY 4 HOURS PRN
OUTPATIENT
Start: 2024-11-11

## 2024-11-11 RX ORDER — SODIUM CHLORIDE 9 MG/ML
40 INJECTION, SOLUTION INTRAVENOUS AS NEEDED
OUTPATIENT
Start: 2024-11-11

## 2024-11-12 ENCOUNTER — TELEPHONE (OUTPATIENT)
Dept: CARDIOLOGY | Facility: CLINIC | Age: 64
End: 2024-11-12
Payer: MEDICARE

## 2024-11-12 NOTE — TELEPHONE ENCOUNTER
I called to f/u with the patient. I answered all of her questions about her upcoming PVA on 12/19/24.

## 2024-11-12 NOTE — TELEPHONE ENCOUNTER
Caller: Brianna Escalera    Relationship: Self    Best call back number: [phone]    What is the best time to reach you: ANYTIME    What was the call regarding: PT IS WANTING TO KNOW IF AN ABLATION WILL SLOW HER HR DOWN. PLEASE CALL BACK [facility], THANK YOU.

## 2024-11-20 ENCOUNTER — TELEPHONE (OUTPATIENT)
Dept: CARDIOLOGY | Facility: CLINIC | Age: 64
End: 2024-11-20
Payer: MEDICARE

## 2024-11-20 NOTE — TELEPHONE ENCOUNTER
Advised patient that general anesthesia is used for procedure. Patient states she gets very anxious prior to being put to sleep and would like Dr. Llanos and the staff to be aware. Will send to PVA coordinator to note this.

## 2024-11-20 NOTE — TELEPHONE ENCOUNTER
Spoke with patient and answered any further questions she has about procedure sedation/anesthesia. Patient verbalized understanding, all questions answered at this time.

## 2024-11-20 NOTE — TELEPHONE ENCOUNTER
Caller: Brianna Escalera    Relationship: Self    Best call back number: [phone]    What is the best time to reach you: ANY    Who are you requesting to speak with (clinical staff, provider,  specific staff member): ANY      What was the call regarding: PT WANTS TO KNOW IF SHE WILL GET PUT TO SLEEP FOR ABLATION     Is it okay if the provider responds through MyChart:

## 2024-11-21 ENCOUNTER — CLINICAL SUPPORT (OUTPATIENT)
Dept: CARDIOLOGY | Facility: CLINIC | Age: 64
End: 2024-11-21
Payer: MEDICARE

## 2024-11-21 DIAGNOSIS — I48.0 PAF (PAROXYSMAL ATRIAL FIBRILLATION): Primary | ICD-10-CM

## 2024-11-21 PROCEDURE — 93000 ELECTROCARDIOGRAM COMPLETE: CPT | Performed by: INTERNAL MEDICINE

## 2024-11-21 NOTE — PROGRESS NOTES
Procedure     ECG 12 Lead    Date/Time: 11/21/2024 3:51 PM  Performed by: Ramonita Kebede MD    Authorized by: Ramonita Kebede MD  Comparison: compared with previous ECG from 11/1/2024  Similar to previous ECG  Rhythm: sinus rhythm and sinus arrhythmia  Rate: normal  QRS axis: normal    Clinical impression: non-specific ECG

## 2024-11-26 ENCOUNTER — TELEPHONE (OUTPATIENT)
Dept: CARDIOLOGY | Facility: CLINIC | Age: 64
End: 2024-11-26
Payer: MEDICARE

## 2024-11-26 NOTE — TELEPHONE ENCOUNTER
Spoke with patient concerning heart rate. She reports that she noticed heart rate while she was laying back relaxing this morning at 49. Heart rate did increase when she moved around. Patient checked heart rate while sitting talking to nurse and heart rate was 59. Advised patient that heart rate does decrease when at rest.

## 2024-11-26 NOTE — TELEPHONE ENCOUNTER
Caller: Brianna Escalera    Relationship: Self    Best call back number: [phone]    What is the best time to reach you: ANY    What was the call regarding: PATIENT ASKING IF  MG FLECANIDE WILL SLOW HER HEART RATE? PLEASE CALL THE PATIENT TO ADVISE.     Is it okay if the provider responds through MyChart: NO

## 2024-12-02 ENCOUNTER — TELEPHONE (OUTPATIENT)
Dept: CARDIOLOGY | Facility: CLINIC | Age: 64
End: 2024-12-02
Payer: MEDICARE

## 2024-12-04 ENCOUNTER — CLINICAL SUPPORT (OUTPATIENT)
Dept: CARDIOLOGY | Facility: CLINIC | Age: 64
End: 2024-12-04
Payer: MEDICARE

## 2024-12-04 DIAGNOSIS — I48.0 PAF (PAROXYSMAL ATRIAL FIBRILLATION): Primary | ICD-10-CM

## 2024-12-04 DIAGNOSIS — I47.10 PAROXYSMAL SVT (SUPRAVENTRICULAR TACHYCARDIA): ICD-10-CM

## 2024-12-04 DIAGNOSIS — I49.3 PVC (PREMATURE VENTRICULAR CONTRACTION): ICD-10-CM

## 2024-12-04 PROCEDURE — 93000 ELECTROCARDIOGRAM COMPLETE: CPT | Performed by: NURSE PRACTITIONER

## 2024-12-04 NOTE — PROGRESS NOTES
Procedure     ECG 12 Lead    Date/Time: 12/4/2024 4:11 PM  Performed by: Ramandeep Cline APRN    Authorized by: Ramandeep Cline APRN  Comparison: compared with previous ECG   Similar to previous ECG  Rhythm: sinus rhythm  Rate: normal  BPM: 83  ST Segments: ST segments normal    Clinical impression: normal ECG and non-specific ECG  Comments: QTc 352 ms

## 2024-12-05 RX ORDER — FLECAINIDE ACETATE 100 MG/1
100 TABLET ORAL 2 TIMES DAILY
Qty: 60 TABLET | Refills: 3 | Status: SHIPPED | OUTPATIENT
Start: 2024-12-05

## 2024-12-05 NOTE — TELEPHONE ENCOUNTER
Caller: Brianna Escalera Mar    Relationship: Self    Best call back number: [phone]    Requested Prescriptions:   Requested Prescriptions     Pending Prescriptions Disp Refills    flecainide (TAMBOCOR) 50 MG tablet 60 tablet 11     Sig: Take 1 tablet by mouth 2 (Two) Times a Day. EKG 48 hrs after starting        Pharmacy where request should be sent: [address] [phone] [facility] [phone]      Last office visit with prescribing clinician: 9/18/2024   Last telemedicine visit with prescribing clinician: Visit date not found   Next office visit with prescribing clinician: 4/1/2025     Additional details provided by patient: PATIENT HAD AN INCREASE IN DOSAGE AND NEEDS A REFILL ON THIS MED     Does the patient have less than a 3 day supply:  [x] Yes  [] No    Would you like a call back once the refill request has been completed: [x] Yes [] No    If the office needs to give you a call back, can they leave a voicemail: [] Yes [x] No    Gilbert Miranda Rep   12/05/24 08:17 EST

## 2024-12-09 ENCOUNTER — TELEPHONE (OUTPATIENT)
Dept: CARDIOLOGY | Facility: CLINIC | Age: 64
End: 2024-12-09
Payer: MEDICARE

## 2024-12-09 NOTE — TELEPHONE ENCOUNTER
Hub staff attempted to follow warm transfer process and was unsuccessful     Caller: Brianna Escalera    Relationship to patient: Self    Best call back number: [phone]     Patient is needing: HAS BEEN RUNNING A FEVER, STATES HEART RATE IS HIGH. WONDERING IF THIS IS FROM THE FEVER. PT IS NOW TAKING THE 75MG OF flecainide INSTEAD OF THE 100MG. SUGAR IS UP.      HIGH HEART RATE- 100 BPM    RUNS IN 50S IN 60S NORMALLY PER CALLER     ACTIVE NOW  HAS BEEN GOING ON SINCE LAST NIGHT

## 2024-12-09 NOTE — TELEPHONE ENCOUNTER
Spoke with patient concerning symptoms. She wanted you to know that she decreased flecainide back to 75 mg instead of 100 mg.     Last night she started chilling, has fever and blood sugar is elevated with heart rate noted at 100 today. Advised patient to drink adequate fluids to prevent dehydration and advised patient to see PCP. Patient to contact PCP and be seen.

## 2024-12-31 ENCOUNTER — OFFICE (OUTPATIENT)
Dept: URBAN - METROPOLITAN AREA CLINIC 76 | Facility: CLINIC | Age: 64
End: 2024-12-31

## 2024-12-31 ENCOUNTER — OFFICE (OUTPATIENT)
Dept: URBAN - METROPOLITAN AREA CLINIC 76 | Facility: CLINIC | Age: 64
End: 2024-12-31
Payer: MEDICARE

## 2024-12-31 DIAGNOSIS — K92.1 MELENA: ICD-10-CM

## 2024-12-31 DIAGNOSIS — K64.2 THIRD DEGREE HEMORRHOIDS: ICD-10-CM

## 2024-12-31 PROCEDURE — 99439 CHRNC CARE MGMT STAF EA ADDL: CPT | Performed by: INTERNAL MEDICINE

## 2024-12-31 PROCEDURE — 99490 CHRNC CARE MGMT STAFF 1ST 20: CPT | Performed by: INTERNAL MEDICINE

## 2025-01-13 ENCOUNTER — PRE-ADMISSION TESTING (OUTPATIENT)
Dept: PREADMISSION TESTING | Facility: HOSPITAL | Age: 65
End: 2025-01-13
Payer: MEDICARE

## 2025-01-13 ENCOUNTER — HOSPITAL ENCOUNTER (OUTPATIENT)
Dept: CT IMAGING | Facility: HOSPITAL | Age: 65
Discharge: HOME | End: 2025-01-13
Payer: MEDICARE

## 2025-01-13 DIAGNOSIS — I48.0 PAF (PAROXYSMAL ATRIAL FIBRILLATION): ICD-10-CM

## 2025-01-13 LAB
ANION GAP SERPL CALCULATED.3IONS-SCNC: 9 MMOL/L (ref 5–15)
BUN SERPL-MCNC: 14 MG/DL (ref 8–23)
BUN/CREAT SERPL: 25.5 (ref 7–25)
CALCIUM SPEC-SCNC: 9.7 MG/DL (ref 8.6–10.5)
CHLORIDE SERPL-SCNC: 97 MMOL/L (ref 98–107)
CO2 SERPL-SCNC: 31 MMOL/L (ref 22–29)
CREAT SERPL-MCNC: 0.55 MG/DL (ref 0.57–1)
DEPRECATED RDW RBC AUTO: 41.9 FL (ref 37–54)
EGFRCR SERPLBLD CKD-EPI 2021: 101.9 ML/MIN/1.73
ERYTHROCYTE [DISTWIDTH] IN BLOOD BY AUTOMATED COUNT: 13.3 % (ref 12.3–15.4)
GLUCOSE SERPL-MCNC: 112 MG/DL (ref 65–99)
HCT VFR BLD AUTO: 43.5 % (ref 34–46.6)
HGB BLD-MCNC: 14.6 G/DL (ref 12–15.9)
MCH RBC QN AUTO: 28.9 PG (ref 26.6–33)
MCHC RBC AUTO-ENTMCNC: 33.6 G/DL (ref 31.5–35.7)
MCV RBC AUTO: 86.1 FL (ref 79–97)
PLATELET # BLD AUTO: 337 10*3/MM3 (ref 140–450)
PMV BLD AUTO: 9.7 FL (ref 6–12)
POTASSIUM SERPL-SCNC: 4.2 MMOL/L (ref 3.5–5.2)
RBC # BLD AUTO: 5.05 10*6/MM3 (ref 3.77–5.28)
SODIUM SERPL-SCNC: 137 MMOL/L (ref 136–145)
WBC NRBC COR # BLD AUTO: 8.01 10*3/MM3 (ref 3.4–10.8)

## 2025-01-13 PROCEDURE — 36415 COLL VENOUS BLD VENIPUNCTURE: CPT

## 2025-01-13 PROCEDURE — 71275 CT ANGIOGRAPHY CHEST: CPT

## 2025-01-13 PROCEDURE — 80048 BASIC METABOLIC PNL TOTAL CA: CPT

## 2025-01-13 PROCEDURE — 85027 COMPLETE CBC AUTOMATED: CPT

## 2025-01-13 PROCEDURE — 25510000001 IOPAMIDOL PER 1 ML: Performed by: INTERNAL MEDICINE

## 2025-01-13 RX ORDER — IOPAMIDOL 755 MG/ML
80 INJECTION, SOLUTION INTRAVASCULAR
Status: COMPLETED | OUTPATIENT
Start: 2025-01-13 | End: 2025-01-13

## 2025-01-13 RX ORDER — FLUTICASONE PROPIONATE 50 MCG
SPRAY, SUSPENSION (ML) NASAL
COMMUNITY
Start: 2024-12-13

## 2025-01-13 RX ORDER — MECLIZINE HCL 12.5 MG 12.5 MG/1
TABLET ORAL
COMMUNITY
Start: 2025-01-07

## 2025-01-13 RX ORDER — SIMETHICONE 125 MG
TABLET,CHEWABLE ORAL
COMMUNITY
Start: 2024-11-22

## 2025-01-13 RX ADMIN — IOPAMIDOL 80 ML: 755 INJECTION, SOLUTION INTRAVENOUS at 14:31

## 2025-01-13 NOTE — PAT
An arrival time for procedure was not provided during PAT visit. If patient had any questions or concerns about their arrival time, they were instructed to contact their surgeon/physician.  Additionally, if the patient referred to an arrival time that was acquired from their my chart account, patient was encouraged to verify that time with their surgeon/physician. Arrival times are NOT provided in Pre Admission Testing Department.    Patient denies any current skin issues.     Pt scheduled for CT at [facility] after PAT.

## 2025-02-28 ENCOUNTER — OFFICE (OUTPATIENT)
Dept: URBAN - METROPOLITAN AREA CLINIC 76 | Facility: CLINIC | Age: 65
End: 2025-02-28
Payer: MEDICARE

## 2025-02-28 DIAGNOSIS — K64.2 THIRD DEGREE HEMORRHOIDS: ICD-10-CM

## 2025-02-28 DIAGNOSIS — K92.1 MELENA: ICD-10-CM

## 2025-02-28 PROCEDURE — 99490 CHRNC CARE MGMT STAFF 1ST 20: CPT | Performed by: INTERNAL MEDICINE

## 2025-03-13 ENCOUNTER — TELEPHONE (OUTPATIENT)
Dept: CARDIOLOGY | Facility: CLINIC | Age: 65
End: 2025-03-13

## 2025-03-13 NOTE — TELEPHONE ENCOUNTER
Caller: Brianna Escalera Mar    Relationship: Self    Best call back number: [phone]     What orders are you requesting (i.e. lab or imaging): TEST TO CHECK ON arteries in neck, BLOOD WORK TO CHECK potassium and sodium AND WHITE COUNT     In what timeframe would the patient need to come in: BEFORE 4/1     Where will you receive your lab/imaging services: N/A, [facility] OR [facility]     Additional notes: WEAK FEELING, HEART BEATING FAST     NOT ACTIVE AS OF PHONE CALL, WOULD LIKE TO GET THIS CHECKED OUT BEFORE APPT ON 4/1

## 2025-03-13 NOTE — TELEPHONE ENCOUNTER
Attempted to contact patient concerning symptoms, unable to reach and unable to leave message related to no answering service.

## 2025-03-14 ENCOUNTER — TELEPHONE (OUTPATIENT)
Dept: CARDIOLOGY | Facility: CLINIC | Age: 65
End: 2025-03-14
Payer: MEDICARE

## 2025-03-14 DIAGNOSIS — I10 PRIMARY HYPERTENSION: ICD-10-CM

## 2025-03-14 DIAGNOSIS — R00.2 PALPITATIONS: ICD-10-CM

## 2025-03-14 DIAGNOSIS — I48.0 PAF (PAROXYSMAL ATRIAL FIBRILLATION): Primary | ICD-10-CM

## 2025-03-14 DIAGNOSIS — R07.89 CHEST PAIN, ATYPICAL: ICD-10-CM

## 2025-03-14 NOTE — TELEPHONE ENCOUNTER
I spoke with patient and  she is feeling fatigued and is feeling palpitations , she reports her HR is  in 90's but not usually  over 100.  She has recently had a stomach virus but  she does not feel like she could be dehydrated  . She has follow up April 1,2025 with Ramandeep AMAYA and is scheduled with Dr Llanos  4- for Ablation. Do you want labs  at this time ?  Her last labs were January 2025 prior to  having stomach bug.

## 2025-03-31 ENCOUNTER — OFFICE (OUTPATIENT)
Dept: URBAN - METROPOLITAN AREA CLINIC 76 | Facility: CLINIC | Age: 65
End: 2025-03-31
Payer: MEDICARE

## 2025-03-31 DIAGNOSIS — K92.1 MELENA: ICD-10-CM

## 2025-03-31 DIAGNOSIS — K64.2 THIRD DEGREE HEMORRHOIDS: ICD-10-CM

## 2025-03-31 PROCEDURE — 99490 CHRNC CARE MGMT STAFF 1ST 20: CPT | Performed by: INTERNAL MEDICINE

## 2025-04-01 ENCOUNTER — OFFICE VISIT (OUTPATIENT)
Dept: CARDIOLOGY | Facility: CLINIC | Age: 65
End: 2025-04-01
Payer: MEDICARE

## 2025-04-01 VITALS
HEIGHT: 65 IN | SYSTOLIC BLOOD PRESSURE: 130 MMHG | HEART RATE: 63 BPM | BODY MASS INDEX: 35.56 KG/M2 | WEIGHT: 213.4 LBS | DIASTOLIC BLOOD PRESSURE: 80 MMHG

## 2025-04-01 DIAGNOSIS — I49.3 PVC (PREMATURE VENTRICULAR CONTRACTION): ICD-10-CM

## 2025-04-01 DIAGNOSIS — I47.10 PAROXYSMAL SVT (SUPRAVENTRICULAR TACHYCARDIA): ICD-10-CM

## 2025-04-01 DIAGNOSIS — I49.1 PREMATURE ATRIAL CONTRACTIONS: ICD-10-CM

## 2025-04-01 DIAGNOSIS — Z79.01 CHRONIC ANTICOAGULATION: ICD-10-CM

## 2025-04-01 DIAGNOSIS — Z79.899 LONG TERM CURRENT USE OF ANTIARRHYTHMIC DRUG: ICD-10-CM

## 2025-04-01 DIAGNOSIS — I48.0 PAF (PAROXYSMAL ATRIAL FIBRILLATION): ICD-10-CM

## 2025-04-01 DIAGNOSIS — E78.00 HYPERCHOLESTEROLEMIA: ICD-10-CM

## 2025-04-01 DIAGNOSIS — I10 PRIMARY HYPERTENSION: Primary | ICD-10-CM

## 2025-04-01 RX ORDER — FLECAINIDE ACETATE 50 MG/1
TABLET ORAL
Qty: 270 TABLET | Refills: 3 | Status: SHIPPED | OUTPATIENT
Start: 2025-04-01

## 2025-04-01 RX ORDER — ENALAPRIL MALEATE 20 MG/1
20 TABLET ORAL 2 TIMES DAILY
Qty: 180 TABLET | Refills: 3 | Status: SHIPPED | OUTPATIENT
Start: 2025-04-01

## 2025-04-01 RX ORDER — TRIAMTERENE AND HYDROCHLOROTHIAZIDE 75; 50 MG/1; MG/1
1 TABLET ORAL DAILY
Qty: 90 TABLET | Refills: 3 | Status: SHIPPED | OUTPATIENT
Start: 2025-04-01

## 2025-04-01 RX ORDER — FLECAINIDE ACETATE 100 MG/1
TABLET ORAL
Qty: 135 TABLET | Refills: 3 | Status: SHIPPED | OUTPATIENT
Start: 2025-04-01 | End: 2025-04-01 | Stop reason: DRUGHIGH

## 2025-04-01 NOTE — PROGRESS NOTES
Chief Complaint   Patient presents with    Follow-up     Cardiac management    Lab     Last labs in chart. She reports having C diff 8 days ago, she did take vancomycin.     Palpitations     Has from time to time, same as before. She is scheduled for ablation 4/29/25 with Dr Llanos.    Med Refill     Needs refills on cardiac medications-90 day   Subjective   HPI    Brianna Escalera is a 65 y.o. female with HTN, hypercholesterolemia, palpitations and significant anxiety.  She was referred back to our office in October 2021 after not being seen for 6 years. Stress test in 2015 was inconclusive. Lopressor changed to Coreg. Echo and Holter showed normal LV function, average HR 63 with 6% PACs and 1% PVC.  We tried diltiazem but did not tolerate, carvedilol resumed. Multiple dosage adjustments made.      Nuclear stress 8/2023 showed no ischemia, normal LVEF. Holter 2/2024 showed avg rate 59, 7% PAC, 65 SVT. Flecainide recommended, but she was hesitant to start. She was then seen at [facility] with AF with RVR. She finally agreed take flecainide and Eliquis.  Coreg stopped. She was referred to Dr. Llanos for PVA.  Overnight pulse ox borderline, referred for sleep study.     She returns today for follow up.  Ablation postponed to 4/29/2025 due to C. difficile infection treated with oral vancomycin.  No diarrhea in 1 week.  Feeling better.  No chest pain, no dyspnea.  Labs 3/20/2025 normal CBC, CMP, TSH 1.87, mag 1.7. Lipids 9/15/24: , Tri 178, HDL 56, .     Cardiac History:    Past Surgical History:   Procedure Laterality Date    APPENDECTOMY      CARDIOVASCULAR STRESS TEST  11/17/2015    R.Stress- 5 Min. 74% THR. BP- 156/80. Inconclusive    CARDIOVASCULAR STRESS TEST  06/13/2023    R.Stress. 1.33 Min. 4.6 METS.73% THR. 186/58. Inconclusive    CARDIOVASCULAR STRESS TEST  08/28/2023    (Mod) 5.40 Min. 3.4 METS. 73% THR. 191/84. EF > 70%. R/O Breast attenuation    CHOLECYSTECTOMY      CONVERTED (HISTORICAL) HOLTER   07/15/2023    < 3 Days. Avg 58. 41-89. 8.1% PAC. 16 SVT.    CONVERTED (HISTORICAL) HOLTER  02/24/2024    < 3 Days. Avg 59. 7.6% PAC. 1.3% PVC. 65 SVT    CONVERTED (HISTORICAL) HOLTER  10/21/2024    7 Days. AVG 55. . 6.8% PAC. 19 SVT    ECHO - CONVERTED  12/08/2021    TLS.EF 60%. Trace -Mild MR. RVSP- 21 mmHg    ECHO - CONVERTED  06/06/2023    TLS. EF 60%. LA-4.2. Trace-Mild MR. RVSP- 29 mmHg    HERNIA REPAIR      HYSTERECTOMY      total; with appendectomy, cholecystectomy, and hernia repair    MOUTH SURGERY      OTHER SURGICAL HISTORY  12/04/2021    Event monitor- 1 day. Avg 63. . 6% PAC. 1 % PVC     Current Outpatient Medications   Medication Sig Dispense Refill    apixaban (ELIQUIS) 5 MG tablet tablet Take 1 tablet by mouth 2 (Two) Times a Day. 180 tablet 3    Cholecalciferol (Vitamin D3) 25 MCG (1000 UT) capsule Take 5 capsules by mouth Daily.      docusate sodium (COLACE) 100 MG capsule Take 1 capsule by mouth Every Night.      docusate sodium (COLACE) 250 MG capsule Take 1 capsule by mouth Every Morning.      enalapril (VASOTEC) 20 MG tablet Take 1 tablet by mouth 2 (Two) Times a Day. 180 tablet 3    flecainide (TAMBOCOR) 100 MG tablet Take 1.5 tablets twice daily 135 tablet 3    fluticasone (FLONASE) 50 MCG/ACT nasal spray 1 SPRAY (each nostril) NASAL  DAILY      furosemide (LASIX) 20 MG tablet Take 1 tablet by mouth Daily. Takes 1/2 tablet PRN (Patient taking differently: Take 0.5 tablets by mouth Daily As Needed (BLE swelling).) 90 tablet 3    gabapentin (NEURONTIN) 300 MG capsule Take 1 capsule by mouth Daily As Needed.      HYDROcodone-acetaminophen (NORCO)  MG per tablet Take 1 tablet by mouth 3 (Three) Times a Day As Needed for Moderate Pain.      Linzess 145 MCG capsule capsule Take 1 capsule by mouth Daily.      loratadine (CLARITIN) 10 MG tablet Take 1 tablet by mouth Daily.      meclizine (ANTIVERT) 12.5 MG tablet TAKE 1 TABLET ORAL AS NEEDED EVERY 8 HOURS FOR dizziness.       "Omega-3 Fatty Acids (fish oil) 1000 MG capsule capsule Take 1 capsule by mouth 2 (Two) Times a Day With Meals.      pantoprazole (PROTONIX) 40 MG EC tablet Take 1 tablet by mouth Daily.      potassium chloride (K-DUR,KLOR-CON) 20 MEQ CR tablet Take 1 tablet by mouth Daily As Needed (with Lasix).      simethicone (MYLICON) 125 MG chewable tablet CHEW AND SWALLLOW ONE TABLET BY MOUTH FOUR TIMES DAILY      triamterene-hydrochlorothiazide (MAXZIDE) 75-50 MG per tablet Take 1 tablet by mouth Daily. 90 tablet 3     No current facility-administered medications for this visit.     Latex, Sulfamethoxazole-trimethoprim, Cephalexin, Ciprofloxacin, Paroxetine hcl, Penicillin g, Sulfa antibiotics, Tetracycline, Trimethoprim, Other, Amlodipine, Azithromycin, Diltiazem, and Penicillins    Past Medical History:   Diagnosis Date    Anesthesia complication     \"made my heart rate and blood pressure too low\" with spinal anesthesia    Anxiety     panic attacks    Cancer     uterine    Chronic back pain     Depression     Diabetes mellitus     GERD (gastroesophageal reflux disease)     GERD (gastroesophageal reflux disease)     H/O hernia repair     H/O: hysterectomy     Heart murmur     History of cholecystectomy     History of transfusion     during surgery, [facility], no reaction    Hx of appendectomy     Hypertension     PAF (paroxysmal atrial fibrillation) 2024    Stroke     right optic nerve; >10 yrs ago     Social History     Socioeconomic History    Marital status:    Tobacco Use    Smoking status: Former     Current packs/day: 0.00     Average packs/day: 1 pack/day for 20.0 years (20.0 ttl pk-yrs)     Types: Cigarettes     Start date: 1986     Quit date: 2006     Years since quittin.2     Passive exposure: Current    Smokeless tobacco: Never    Tobacco comments:     Cant remember   Vaping Use    Vaping status: Never Used   Substance and Sexual Activity    Alcohol use: No    Drug use: Never    " "Sexual activity: Defer     Family History   Problem Relation Age of Onset    Heart attack Mother         multiple. First in 40's    Hypertension Mother     Diabetes Mother     Cancer Mother     Heart disease Mother     No Known Problems Father     Heart disease Other         palpatations    Hypertension Sister         Sugar  and high blood pressure    Heart attack Sister     Kidney disease Sister     Heart failure Sister      Review of Systems   Constitutional: Positive for weight gain (+13). Negative for decreased appetite and malaise/fatigue.   HENT: Negative.     Eyes:  Negative for blurred vision.   Cardiovascular:  Positive for palpitations. Negative for chest pain, dyspnea on exertion, leg swelling and syncope.   Respiratory:  Negative for shortness of breath and sleep disturbances due to breathing.    Endocrine: Negative.    Hematologic/Lymphatic: Negative for bleeding problem. Does not bruise/bleed easily.   Skin: Negative.    Musculoskeletal:  Negative for falls and myalgias.   Gastrointestinal:  Negative for abdominal pain, heartburn and melena.   Genitourinary:  Negative for hematuria.   Neurological:  Negative for dizziness and light-headedness.   Psychiatric/Behavioral:  Negative for altered mental status.    Allergic/Immunologic: Negative.       Objective     /80 (BP Location: Left arm, Patient Position: Sitting)   Pulse 63   Ht 165.1 cm (65\")   Wt 96.8 kg (213 lb 6.4 oz)   BMI 35.51 kg/m²     Vitals and nursing note reviewed.   Constitutional:       General: Not in acute distress.     Appearance: Well-developed. Not diaphoretic.   Eyes:      Pupils: Pupils are equal, round, and reactive to light.   HENT:      Head: Normocephalic.   Pulmonary:      Effort: Pulmonary effort is normal. No respiratory distress.      Breath sounds: Normal breath sounds.   Cardiovascular:      Normal rate. Regular rhythm.   Pulses:     Intact distal pulses.   Edema:     Peripheral edema absent.   Abdominal:      " General: Bowel sounds are normal.      Palpations: Abdomen is soft.   Musculoskeletal: Normal range of motion.      Cervical back: Normal range of motion. Skin:     General: Skin is warm and dry.   Neurological:      Mental Status: Alert and oriented to person, place, and time.        ECG 12 Lead    Date/Time: 4/1/2025 9:53 AM  Performed by: Ramandeep Cline APRN    Authorized by: Ramandeep Cline APRN  Comparison: compared with previous ECG   Similar to previous ECG  Rhythm: sinus rhythm  Rate: normal  BPM: 63  ST Segments: ST segments normal    Clinical impression: non-specific ECG  Comments: QTc 437 ms            Problem List Items Addressed This Visit          Cardiac and Vasculature    Primary hypertension - Primary    Relevant Medications    enalapril (VASOTEC) 20 MG tablet    triamterene-hydrochlorothiazide (MAXZIDE) 75-50 MG per tablet    Premature atrial contractions    Overview   7-day Holter, 10/3/2024: 6.8% PAC burden.  19 episodes of SVT (atrial tachycardia), all untriggered events.         PVC (premature ventricular contraction)    Paroxysmal SVT (supraventricular tachycardia)    Overview   MPS, 8/28/2023: No reversible ischemia, EF greater than 70%  7-day Holter, 10/3/2024: 6.8% PAC burden.  19 episodes of SVT (atrial tachycardia), all untriggered events.         Hypercholesterolemia    PAF (paroxysmal atrial fibrillation)    Overview   Echo, 6/6/2023: EF 55-60%.  Left atrium 4.2 cm.  Normal valvular morphology.  Echo, 8/19/2024: EF 60%.  Left atrium 4.4 cm.  Normal valvular morphology         Long term current use of antiarrhythmic drug       Coag and Thromboembolic    Chronic anticoagulation      PAF/PVC/PAC  -EKG is sinus rhythm at 63 bpm, QTc 437 ms  -Continue flecainide 75 mg twice daily, Eliquis  -Advised to keep appointments with EP/Dr. Llanos  -PVA scheduled 4/29/25    HTN  -Well-controlled with enalapril, triamterene-HCTZ    Hyperlipidemia  -LDL improved to 102, trigs improved  -continue heart  healthy diet, fish oil   -Labs followed by PCP    Follow-up 6 months, sooner if needed.            Electronically signed by MONIQUE Orona,  April 1, 2025 09:54 EDT

## 2025-04-02 ENCOUNTER — TELEPHONE (OUTPATIENT)
Dept: CARDIOLOGY | Facility: CLINIC | Age: 65
End: 2025-04-02
Payer: MEDICARE

## 2025-04-02 NOTE — TELEPHONE ENCOUNTER
Received message from Lb at pharmacy requesting clarification on Flecainide script.    I see script for flecainide 50 mg 1.5 tablets bid, is that what you want to continue?

## 2025-04-02 NOTE — TELEPHONE ENCOUNTER
Yes, I changed it to a 50 mg tablet, take 1.5 tabs twice daily.    Brianna clarified with me that is what she is taking. 75 mg BID

## 2025-04-02 NOTE — TELEPHONE ENCOUNTER
Spoke with Althea at pharmacy, Althea made aware Flecainide was changed to 50 mg tablet 1.5 tablets twice daily, understanding voiced.

## 2025-04-17 ENCOUNTER — TELEPHONE (OUTPATIENT)
Dept: CARDIOLOGY | Facility: CLINIC | Age: 65
End: 2025-04-17
Payer: MEDICARE

## 2025-04-17 NOTE — TELEPHONE ENCOUNTER
Patient left message requesting call back concerning odd feeling that she was having.    Attempted to contact patient, unable to reach and unable to leave message related to no voice mail.

## 2025-04-18 NOTE — TELEPHONE ENCOUNTER
"Spoke with patient concerning symptoms. Patient reports that she is noticing pounding pulse in her ears, states \"it sounds like drums in my ears\". BP has been fluctuating. When she saw PCP the other day /70s and when she got home with slight exertion /80. Reports \"funny feeling in chest\" at times will have some fluttering yet has noticed a dull ache in mid chest. She has checked blood sugar 108. Heart rate has been 57-70s.  "

## 2025-04-19 NOTE — TELEPHONE ENCOUNTER
She may be having episodes of PAF causing change in bp and pounding.     Ablation scheduled 4/29 with Dr. Llanos.    Recommend that she keep a bp log for one week, check twice daily and submit readings.

## 2025-04-21 NOTE — TELEPHONE ENCOUNTER
Spoke with patient, aware may be having episodes of PAF causing change in BP and pounding. Has ablation scheduled for 4/29/25 with Dr Llanos. Recommendations to keep a BP log for one week, check twice daily and submit readings. Patient reports BP and HR was elevated and she went to ER today at [facility], was given clonidine.     Attempting to obtain records from [facility].

## 2025-04-22 RX ORDER — CLONIDINE HYDROCHLORIDE 0.1 MG/1
0.1 TABLET ORAL 2 TIMES DAILY
Qty: 60 TABLET | Refills: 6 | Status: SHIPPED | OUTPATIENT
Start: 2025-04-22

## 2025-04-22 NOTE — TELEPHONE ENCOUNTER
ER notes reviewed.  Everything was normal except potassium a little low at 3.1.  Ask her to take potassium supplement daily.  I will send in clonidine to use as needed.  Keep a BP log as recommended and let me see it after 5 to 7 days.

## 2025-04-22 NOTE — TELEPHONE ENCOUNTER
Patient made aware of ER records have been reviewed. Aware need to take potassium supplement daily. Aware clonidine was sent to pharmacy to be used as needed. Aware to keep a BP log as recommended and let office see it after 5 to 7 days. Patient voiced understanding.

## 2025-04-23 ENCOUNTER — TELEPHONE (OUTPATIENT)
Dept: CARDIOLOGY | Facility: CLINIC | Age: 65
End: 2025-04-23
Payer: MEDICARE

## 2025-04-23 NOTE — TELEPHONE ENCOUNTER
Caller: Brianna Escalera Mar     Relationship: SELF     Best call back number: [phone]    What is your medical concern? HER BP IS RUNNING ABNORMALLY HIGH AND IS HAVING A WEIRD FEELING IN HER CHEST.     How long has this issue been going on? LAST WEEK    Is your provider already aware of this issue? FOR BP YES    Have you been treated for this issue? FOR BP YES

## 2025-04-23 NOTE — TELEPHONE ENCOUNTER
Spoke with patient concerning BP readings and patient had questions concerning EKG from [facility]. Patient reports BP today has been 158/89 and 163/88.  Patient had looked at EKG from hospital and thought it said she had heart attack. Advised patient that Ramandeep AMAYA had reviewed all records from [facility] and recommendations to take potassium supplement daily, monitor BP for 5-7 days, continue clonidine as needed and reports BP readings in 5-7 days. Patient voiced understanding and reports she will call office back in 5-7 days.

## 2025-04-30 ENCOUNTER — CLINICAL SUPPORT (OUTPATIENT)
Dept: CARDIOLOGY | Facility: CLINIC | Age: 65
End: 2025-04-30
Payer: MEDICARE

## 2025-04-30 ENCOUNTER — OFFICE (OUTPATIENT)
Dept: URBAN - METROPOLITAN AREA CLINIC 76 | Facility: CLINIC | Age: 65
End: 2025-04-30
Payer: MEDICARE

## 2025-04-30 DIAGNOSIS — I49.3 PVC (PREMATURE VENTRICULAR CONTRACTION): ICD-10-CM

## 2025-04-30 DIAGNOSIS — I48.0 PAF (PAROXYSMAL ATRIAL FIBRILLATION): ICD-10-CM

## 2025-04-30 DIAGNOSIS — K92.1 MELENA: ICD-10-CM

## 2025-04-30 DIAGNOSIS — I47.10 PAROXYSMAL SVT (SUPRAVENTRICULAR TACHYCARDIA): Primary | ICD-10-CM

## 2025-04-30 DIAGNOSIS — K64.2 THIRD DEGREE HEMORRHOIDS: ICD-10-CM

## 2025-04-30 PROCEDURE — 99490 CHRNC CARE MGMT STAFF 1ST 20: CPT | Performed by: INTERNAL MEDICINE

## 2025-05-01 PROCEDURE — 93000 ELECTROCARDIOGRAM COMPLETE: CPT | Performed by: NURSE PRACTITIONER

## 2025-05-01 NOTE — PROGRESS NOTES
No chief complaint on file.    Subjective     HPI    Brianna Escalera is a 65 y.o. female    Cardiac History:    Past Surgical History:   Procedure Laterality Date    APPENDECTOMY      CARDIOVASCULAR STRESS TEST  11/17/2015    R.Stress- 5 Min. 74% THR. BP- 156/80. Inconclusive    CARDIOVASCULAR STRESS TEST  06/13/2023    R.Stress. 1.33 Min. 4.6 METS.73% THR. 186/58. Inconclusive    CARDIOVASCULAR STRESS TEST  08/28/2023    (Mod) 5.40 Min. 3.4 METS. 73% THR. 191/84. EF > 70%. R/O Breast attenuation    CHOLECYSTECTOMY      CONVERTED (HISTORICAL) HOLTER  07/15/2023    < 3 Days. Avg 58. 41-89. 8.1% PAC. 16 SVT.    CONVERTED (HISTORICAL) HOLTER  02/24/2024    < 3 Days. Avg 59. 7.6% PAC. 1.3% PVC. 65 SVT    CONVERTED (HISTORICAL) HOLTER  10/21/2024    7 Days. AVG 55. . 6.8% PAC. 19 SVT    ECHO - CONVERTED  12/08/2021    TLS.EF 60%. Trace -Mild MR. RVSP- 21 mmHg    ECHO - CONVERTED  06/06/2023    TLS. EF 60%. LA-4.2. Trace-Mild MR. RVSP- 29 mmHg    HERNIA REPAIR      HYSTERECTOMY      total; with appendectomy, cholecystectomy, and hernia repair    MOUTH SURGERY      OTHER SURGICAL HISTORY  12/04/2021    Event monitor- 1 day. Avg 63. . 6% PAC. 1 % PVC       Current Outpatient Medications   Medication Sig Dispense Refill    apixaban (ELIQUIS) 5 MG tablet tablet Take 1 tablet by mouth 2 (Two) Times a Day. 180 tablet 3    Cholecalciferol (Vitamin D3) 25 MCG (1000 UT) capsule Take 5 capsules by mouth Daily.      cloNIDine (CATAPRES) 0.1 MG tablet Take 1 tablet by mouth 2 (Two) Times a Day. As needed for BP greater than 150/90 60 tablet 6    docusate sodium (COLACE) 100 MG capsule Take 1 capsule by mouth Every Night.      docusate sodium (COLACE) 250 MG capsule Take 1 capsule by mouth Every Morning.      enalapril (VASOTEC) 20 MG tablet Take 1 tablet by mouth 2 (Two) Times a Day. 180 tablet 3    flecainide (TAMBOCOR) 50 MG tablet Take 1.5 tablets twice daily 270 tablet 3    fluticasone (FLONASE) 50 MCG/ACT nasal  "spray 1 SPRAY (each nostril) NASAL  DAILY      furosemide (LASIX) 20 MG tablet Take 1 tablet by mouth Daily. Takes 1/2 tablet PRN (Patient taking differently: Take 0.5 tablets by mouth Daily As Needed (BLE swelling).) 90 tablet 3    gabapentin (NEURONTIN) 300 MG capsule Take 1 capsule by mouth Daily As Needed.      HYDROcodone-acetaminophen (NORCO)  MG per tablet Take 1 tablet by mouth 3 (Three) Times a Day As Needed for Moderate Pain.      Linzess 145 MCG capsule capsule Take 1 capsule by mouth Daily.      loratadine (CLARITIN) 10 MG tablet Take 1 tablet by mouth Daily.      meclizine (ANTIVERT) 12.5 MG tablet TAKE 1 TABLET ORAL AS NEEDED EVERY 8 HOURS FOR dizziness.      Omega-3 Fatty Acids (fish oil) 1000 MG capsule capsule Take 1 capsule by mouth 2 (Two) Times a Day With Meals.      pantoprazole (PROTONIX) 40 MG EC tablet Take 1 tablet by mouth Daily.      potassium chloride (K-DUR,KLOR-CON) 20 MEQ CR tablet Take 1 tablet by mouth Daily As Needed (with Lasix).      simethicone (MYLICON) 125 MG chewable tablet CHEW AND SWALLLOW ONE TABLET BY MOUTH FOUR TIMES DAILY      triamterene-hydrochlorothiazide (MAXZIDE) 75-50 MG per tablet Take 1 tablet by mouth Daily. 90 tablet 3     No current facility-administered medications for this visit.       Latex, Sulfamethoxazole-trimethoprim, Cephalexin, Ciprofloxacin, Paroxetine hcl, Penicillin g, Sulfa antibiotics, Tetracycline, Trimethoprim, Other, Amlodipine, Azithromycin, Diltiazem, and Penicillins    Past Medical History:   Diagnosis Date    Anesthesia complication     \"made my heart rate and blood pressure too low\" with spinal anesthesia    Anxiety     panic attacks    Cancer     uterine    Chronic back pain     Depression     Diabetes mellitus     GERD (gastroesophageal reflux disease)     GERD (gastroesophageal reflux disease)     H/O hernia repair     H/O: hysterectomy     Heart murmur     History of cholecystectomy     History of transfusion     during surgery, " [facility], no reaction    Hx of appendectomy     Hypertension     PAF (paroxysmal atrial fibrillation) 2024    Stroke     right optic nerve; >10 yrs ago       Social History     Socioeconomic History    Marital status:    Tobacco Use    Smoking status: Former     Current packs/day: 0.00     Average packs/day: 1 pack/day for 20.0 years (20.0 ttl pk-yrs)     Types: Cigarettes     Start date: 1986     Quit date: 2006     Years since quittin.3     Passive exposure: Current    Smokeless tobacco: Never    Tobacco comments:     Cant remember   Vaping Use    Vaping status: Never Used   Substance and Sexual Activity    Alcohol use: No    Drug use: Never    Sexual activity: Defer       Family History   Problem Relation Age of Onset    Heart attack Mother         multiple. First in 40's    Hypertension Mother     Diabetes Mother     Cancer Mother     Heart disease Mother     No Known Problems Father     Heart disease Other         palpatations    Hypertension Sister         Sugar  and high blood pressure    Heart attack Sister     Kidney disease Sister     Heart failure Sister        ROS     Objective     There were no vitals taken for this visit.    Physical Exam       ECG 12 Lead    Date/Time: 2025 2:35 PM  Performed by: Ramandeep Cline APRN    Authorized by: Ramandeep Cline APRN  Comparison: compared with previous ECG   Similar to previous ECG  Rhythm: sinus rhythm and sinus arrhythmia  Rate: normal  BPM: 68  ST Segments: ST segments normal    Clinical impression: normal ECG  Comments: QTc 446 ms                Problem List Items Addressed This Visit    None                   Electronically signed by MONIQUE Orona,  May 1, 2025 14:36 EDT

## 2025-05-02 ENCOUNTER — TELEPHONE (OUTPATIENT)
Dept: CARDIOLOGY | Facility: CLINIC | Age: 65
End: 2025-05-02

## 2025-05-02 NOTE — TELEPHONE ENCOUNTER
Caller: Jenaavinashsylwia Brianna Mar    Relationship: Self    Best call back number: [phone]    What is the best time to reach you: ANY    Who are you requesting to speak with (clinical staff, provider,  specific staff member): CLINICAL    What was the call regarding: PATIENT CALLED TO ASK IF TERRA EVER GOT A CHANCE TO LOOK AT THE RESULTS FROM HER RECENT EKG, AND IF SO SHE WANTS A CALL BACK FROM SOMEONE ABOUT WHAT SHE THINKS. PLEASE CALL HER BACK WHEN POSSIBLE. THANK YOU    Is it okay if the provider responds through [email or web address]: PLEASE CALL

## 2025-05-05 NOTE — TELEPHONE ENCOUNTER
Patient made aware of EKG results, aware no new recommendations until she sees EP again. Patient voiced understanding.

## 2025-05-13 ENCOUNTER — TELEPHONE (OUTPATIENT)
Dept: CARDIOLOGY | Facility: CLINIC | Age: 65
End: 2025-05-13

## 2025-05-13 NOTE — TELEPHONE ENCOUNTER
Caller: Brianna Escalera Mar    Relationship: Self    Best call back number: [phone]    Caller requesting test results: PATIENT    What test was performed: HOLTER MONITOR    When was the test performed: SENT IN ON 5/1/25    Where was the test performed:     Additional notes: PATIENT CALLED TO CHECK IF THE RESULTS WERE BACK YET SO SHE CAN DISCUSS THEM WITH DR CARRANZA. PLEASE CALL HER TO DISCUSS THEM WHEN THEY'RE IN. THANK YOU

## 2025-05-13 NOTE — TELEPHONE ENCOUNTER
Patient reports she had holter monitored ordered from an ER visit and was wondering if holter results had been sent to this facility. Advised patient that this office does not have any records of recent holter monitor from another facility.

## 2025-05-15 ENCOUNTER — TELEPHONE (OUTPATIENT)
Dept: CARDIOLOGY | Facility: CLINIC | Age: 65
End: 2025-05-15
Payer: MEDICARE

## 2025-05-15 NOTE — TELEPHONE ENCOUNTER
Patient called to report that she has tested positive for Cdiff and is being treated with 10 days of oral vancomycin. She is scheduled for a PVA on 5/22.     Per Dr. Llanos, he would like to push her PVA back one week to allow this to clear up.     Patient aware and agreeable to plan.

## 2025-05-19 ENCOUNTER — OUTSIDE FACILITY SERVICE (OUTPATIENT)
Dept: CARDIOLOGY | Facility: CLINIC | Age: 65
End: 2025-05-19
Payer: MEDICARE

## 2025-05-20 PROCEDURE — 93000 ELECTROCARDIOGRAM COMPLETE: CPT | Performed by: NURSE PRACTITIONER

## 2025-05-20 NOTE — PROGRESS NOTES
Procedure     ECG 12 Lead    Date/Time: 5/20/2025 10:57 AM  Performed by: Ramandeep Cline APRN    Authorized by: Ramandeep Cline APRN  Comparison: compared with previous ECG   Similar to previous ECG  Rhythm: sinus rhythm and sinus arrhythmia  Rate: normal  BPM: 68  ST Segments: ST segments normal    Clinical impression: normal ECG  Comments: QTc 446 ms

## 2025-06-04 ENCOUNTER — TELEPHONE (OUTPATIENT)
Dept: CARDIOLOGY | Facility: CLINIC | Age: 65
End: 2025-06-04
Payer: MEDICARE

## 2025-06-04 NOTE — TELEPHONE ENCOUNTER
Caller: Brianan Escalera    Relationship: Self    Best call back number: [phone]    What is the best time to reach you: ANY    Who are you requesting to speak with (clinical staff, provider,  specific staff member): CLINICAL       What was the call regarding: PATIENT IS ON A FLUCONACZOLE 150 MG WANTS TO ASK US IF THAT MEDICATION WOULD CONFLICT WITH MEDICATION WE HAVE THEM ON    Is it okay if the provider responds through MyChart: NO

## 2025-06-04 NOTE — TELEPHONE ENCOUNTER
I spoke with patient and advised her to discuss concerns with her pharmacist. Understanding voiced.

## 2025-06-12 ENCOUNTER — PREP FOR SURGERY (OUTPATIENT)
Dept: OTHER | Facility: HOSPITAL | Age: 65
End: 2025-06-12
Payer: MEDICARE

## 2025-06-12 RX ORDER — ACETAMINOPHEN 325 MG/1
650 TABLET ORAL EVERY 4 HOURS PRN
OUTPATIENT
Start: 2025-06-12

## 2025-06-12 RX ORDER — SODIUM CHLORIDE 9 MG/ML
40 INJECTION, SOLUTION INTRAVENOUS AS NEEDED
OUTPATIENT
Start: 2025-06-12

## 2025-06-12 RX ORDER — ONDANSETRON 2 MG/ML
4 INJECTION INTRAMUSCULAR; INTRAVENOUS EVERY 6 HOURS PRN
OUTPATIENT
Start: 2025-06-12

## 2025-06-12 RX ORDER — NITROGLYCERIN 0.4 MG/1
0.4 TABLET SUBLINGUAL
OUTPATIENT
Start: 2025-06-12

## 2025-06-18 ENCOUNTER — TELEPHONE (OUTPATIENT)
Dept: CARDIOLOGY | Facility: CLINIC | Age: 65
End: 2025-06-18
Payer: MEDICARE

## 2025-06-18 NOTE — TELEPHONE ENCOUNTER
"Patient called reporting increased shortness of breath. Reports when she stands up heart will race, irregular beats and heart \"feels funny\". She saw PCP today and /82 HR 50s and O2 sat 100.     Has not seen Dr Llanos yet related to having C diff. Has appointment with Deni Ramirez next week.  "

## 2025-06-18 NOTE — TELEPHONE ENCOUNTER
Patient made aware vitals normal, holter monitor from last month reviewed. Aware at this time no need to change anything until she can see EP. Understanding voiced.

## 2025-06-18 NOTE — TELEPHONE ENCOUNTER
Vitals are normal.  Holter worn last month reviewed.  I do not think we need to change anything until she can see EP.

## 2025-06-24 ENCOUNTER — OFFICE VISIT (OUTPATIENT)
Dept: CARDIOLOGY | Facility: HOSPITAL | Age: 65
End: 2025-06-24
Payer: MEDICARE

## 2025-06-24 VITALS — HEART RATE: 73 BPM | SYSTOLIC BLOOD PRESSURE: 165 MMHG | DIASTOLIC BLOOD PRESSURE: 74 MMHG | OXYGEN SATURATION: 98 %

## 2025-06-24 DIAGNOSIS — I10 PRIMARY HYPERTENSION: ICD-10-CM

## 2025-06-24 DIAGNOSIS — I48.0 PAF (PAROXYSMAL ATRIAL FIBRILLATION): Primary | ICD-10-CM

## 2025-06-24 PROCEDURE — 3078F DIAST BP <80 MM HG: CPT | Performed by: NURSE PRACTITIONER

## 2025-06-24 PROCEDURE — 99215 OFFICE O/P EST HI 40 MIN: CPT | Performed by: NURSE PRACTITIONER

## 2025-06-24 PROCEDURE — 1159F MED LIST DOCD IN RCRD: CPT | Performed by: NURSE PRACTITIONER

## 2025-06-24 PROCEDURE — 3077F SYST BP >= 140 MM HG: CPT | Performed by: NURSE PRACTITIONER

## 2025-06-24 PROCEDURE — G2211 COMPLEX E/M VISIT ADD ON: HCPCS | Performed by: NURSE PRACTITIONER

## 2025-06-24 PROCEDURE — 1160F RVW MEDS BY RX/DR IN RCRD: CPT | Performed by: NURSE PRACTITIONER

## 2025-06-24 RX ORDER — VITAMIN K2 90 MCG
1 CAPSULE ORAL DAILY
COMMUNITY
Start: 2025-04-21

## 2025-06-24 RX ORDER — SODIUM CHLORIDE 0.65 %
AEROSOL, SPRAY (ML) NASAL
COMMUNITY
Start: 2025-06-12

## 2025-06-24 RX ORDER — NYSTATIN 100000 [USP'U]/ML
SUSPENSION ORAL
COMMUNITY
Start: 2025-05-08

## 2025-06-24 RX ORDER — METOPROLOL TARTRATE 25 MG/1
12.5 TABLET, FILM COATED ORAL EVERY 12 HOURS PRN
Qty: 30 TABLET | Refills: 0 | Status: SHIPPED | OUTPATIENT
Start: 2025-06-24

## 2025-06-24 RX ORDER — CLONAZEPAM 1 MG/1
1 TABLET ORAL EVERY 12 HOURS SCHEDULED
COMMUNITY
Start: 2025-05-27

## 2025-06-24 RX ORDER — AMOXICILLIN 500 MG
1350 CAPSULE ORAL
COMMUNITY
Start: 2025-03-11

## 2025-06-24 RX ORDER — LACTOBACILLUS RHAMNOSUS GG 10B CELL
CAPSULE ORAL
COMMUNITY
Start: 2025-06-11

## 2025-06-24 RX ORDER — HYDROCORTISONE ACETATE 25 MG/1
SUPPOSITORY RECTAL EVERY 12 HOURS SCHEDULED
COMMUNITY
Start: 2025-02-21

## 2025-06-24 NOTE — PROGRESS NOTES
Chief Complaint  Atrial Fibrillation (Pre-Ablation)    Subjective      History of Present Illness {CC  Problem List  Visit  Diagnosis   Encounters  Notes  Medications  Labs  Result Review Imaging  Media :23}     Brianna Escalera, 65 y.o. female with paroxysmal atrial fibrillation, HTN, DM presents to [facility] for Atrial Fibrillation (Pre-Ablation).    Patient with prior evaluation by electrophysiology November 2024 for and discussion of atrial fibrillation ablation.  Patient scheduled for atrial fibrillation PFA with Dr. Llanos 8/7/2025.  Primary cardiology with Dr. Kebede in [place]. Recent lab work 4/21/2025 with potassium 3.1, creatinine 0.6.    Presents today with intermittent daily palpitations. Describes palpitations as a funky heartbeat that lasts for approximately 5 minutes.  Denies near-syncope/syncope. SBP generally 130-140s on home monitoring.  Denies bleeding complications on anticoagulation.      Risk factor screening:  Thyroid disorder: Normal TSH March 2025  Sleep apnea: Prior sleep study September 2024  HTN: Controlled on home monitoring  Weight: Discussed 10% reduction goal  Alcohol: None      Objective     Vital Signs:   Vitals:    06/24/25 0940 06/24/25 0941 06/24/25 0952   BP: (!) 191/81 (!) 195/88 165/74   BP Location: Left arm Left arm Left arm   Patient Position: Sitting Standing Sitting   Cuff Size: Adult Adult Adult   Pulse: 79 81 73   SpO2: 99% 100% 98%     There is no height or weight on file to calculate BMI.  Physical Exam  Vitals and nursing note reviewed.   Constitutional:       Appearance: Normal appearance.   HENT:      Head: Normocephalic.   Eyes:      Extraocular Movements: Extraocular movements intact.   Neck:      Vascular: No carotid bruit.   Cardiovascular:      Rate and Rhythm: Normal rate and regular rhythm.      Pulses: Normal pulses.      Heart sounds: Normal heart sounds, S1 normal and S2 normal. No  murmur heard.  Pulmonary:      Effort: Pulmonary effort is normal. No respiratory distress.      Breath sounds: Normal breath sounds.   Musculoskeletal:      Cervical back: Neck supple.      Right lower leg: No edema.      Left lower leg: No edema.   Skin:     General: Skin is warm and dry.   Neurological:      General: No focal deficit present.      Mental Status: She is alert.   Psychiatric:         Mood and Affect: Mood normal.         Behavior: Behavior normal.         Thought Content: Thought content normal.        Data Reviewed:{ Labs  Result Review  Imaging  Med Tab  Media :23}     TSH (03/20/2025 10:25)  Magnesium (03/20/2025 10:24)  Comprehensive Metabolic Panel (03/20/2025 10:24)  CBC & Differential (03/20/2025 10:24)    Holter Monitor - 72 Hour Up To 15 Days (02/05/2024 14:51)  Holter Monitor - 72 Hour Up To 15 Days (10/03/2024 15:50)  Adult Transthoracic Echo Complete W/ Cont if Necessary Per Protocol (06/06/2023 09:42)       Assessment & Plan   Assessment and Plan {CC Problem List  Visit Diagnosis  ROS  Review (Popup)  Health Maintenance  Quality  BestPractice  Medications  SmartSets  SnapShot Encounters  Media :23}     1. PAF (paroxysmal atrial fibrillation)  -Paroxysmal atrial fibrillation with plan for upcoming ablation 8/7/2025  -Reports intermittent short duration tachypalpitation episodes continue, but no sustained tachypalpitation or syncope.  -Regular rate/rhythm at time of exam  -[id number]:4  -Continue anticoagulation with apixaban 5 Mg every 12 hours as prescribed  -Continue flecainide 75 Mg twice daily as prescribed  -Given intermittent palpitation symptoms we will add PRN metoprolol tartrate, low-dose 12.5 Mg every 12 hours as needed  - AF/ablation education provided in office today  - Continue with upcoming ablation as scheduled.  Instructed contact myself or EP if needs arise in interim.    2. Primary hypertension  -SBP generally 130-140s on home monitoring.  -Continue  enalapril as prescribed  -Continue ambulatory BP monitoring      Follow Up {Instructions Charge Capture  Follow-up Communications :23}     Return if symptoms worsen or fail to improve.    Time Spent: I spent 41 minutes caring for Brianna Escalera on this date of service. This time includes time spent by me in the following activities: preparing for the visit, reviewing tests, obtaining and/or reviewing a separately obtained history, performing a medically appropriate examination and/or evaluation, counseling and educating the patient/family/caregiver, documenting information in the medical record and independently interpreting results and communicating that information with the patient/family/caregiver. All time noted occurred on the date of service.    Patient was given instructions and counseling regarding her condition or for health maintenance advice. Please see specific information pulled into the AVS if appropriate. Patient was instructed to call the [facility] with any questions, concerns, or worsening symptoms.    Dictated Utilizing Dragon Dictation   Please note that portions of this note were completed with a voice recognition program. Part of this note may be an electronic transcription/translation of spoken language to printed text using the Dragon Dictation System.

## 2025-06-24 NOTE — PATIENT INSTRUCTIONS
- Check potassium and magnesium with primary care    - Metoprolol 1/2 tablet every 12 hours as needed     - No flecainide or metoprolol for 3 days before procedure

## 2025-06-27 ENCOUNTER — TELEPHONE (OUTPATIENT)
Dept: CARDIOLOGY | Facility: CLINIC | Age: 65
End: 2025-06-27
Payer: MEDICARE

## 2025-06-30 NOTE — TELEPHONE ENCOUNTER
Patient called. She is trying to schedule a banding for her hemorrhoids but she does not have a date. She was advised she cannot hold Eliquis for at least 90 days after PVA scheduled on 8/7. Her GI doctor told her she would need to be off Eliquis 24 hours prior to the banding.     Patient would like to know how close to her PVA scheduled for 8/7 can she have this done since she will need to hold her Eliquis for 24 hours. Thanks!

## 2025-07-14 ENCOUNTER — TELEPHONE (OUTPATIENT)
Dept: CARDIOLOGY | Facility: CLINIC | Age: 65
End: 2025-07-14
Payer: MEDICARE

## 2025-07-14 NOTE — TELEPHONE ENCOUNTER
"Patient called reports BP today was 169/80, she has been having funny feeling in her head and head pressure. Reports for the last month BP has fluctuated with higher readings. States \"my family doctor will not change my medicine because of all my heart problems\".  "

## 2025-07-15 NOTE — TELEPHONE ENCOUNTER
Has she used clonidine PRN?      Can take a clonidine 0.1 mg PRN for bp >150/90.      Keep a log and report

## 2025-07-15 NOTE — TELEPHONE ENCOUNTER
Patient has not took clonidine PRN. Aware can take a clonidine 0.1 mg PRN for BP >150/90, aware to keep a log and report. Understanding voiced.

## 2025-07-16 ENCOUNTER — OFFICE (OUTPATIENT)
Dept: URBAN - METROPOLITAN AREA CLINIC 76 | Facility: CLINIC | Age: 65
End: 2025-07-16
Payer: MEDICARE

## 2025-07-16 VITALS — HEIGHT: 65 IN

## 2025-07-16 DIAGNOSIS — K64.1 SECOND DEGREE HEMORRHOIDS: ICD-10-CM

## 2025-07-16 PROCEDURE — 46221 LIGATION OF HEMORRHOID(S): CPT | Performed by: INTERNAL MEDICINE

## 2025-07-23 ENCOUNTER — TELEPHONE (OUTPATIENT)
Dept: CARDIOLOGY | Facility: CLINIC | Age: 65
End: 2025-07-23
Payer: MEDICARE

## 2025-07-23 NOTE — TELEPHONE ENCOUNTER
Spoke with patient concerning symptoms. Patient reports she having more intermittent palpitations. At time palpitations causing her to be short of breath and dizzy. Reports she is taking medications as ordered and drinking adequate fluids. Patient has concerns related to palpitations now causing increased fatigue.

## 2025-07-24 NOTE — TELEPHONE ENCOUNTER
Patient made aware can take an extra metoprolol 12.5 mg as needed. Is scheduled for ablation on 8/7/25. Patient voiced understanding.

## 2025-07-31 ENCOUNTER — OFFICE (OUTPATIENT)
Dept: URBAN - METROPOLITAN AREA CLINIC 76 | Facility: CLINIC | Age: 65
End: 2025-07-31
Payer: MEDICARE

## 2025-07-31 DIAGNOSIS — K92.1 MELENA: ICD-10-CM

## 2025-07-31 DIAGNOSIS — K64.2 THIRD DEGREE HEMORRHOIDS: ICD-10-CM

## 2025-07-31 PROCEDURE — 99490 CHRNC CARE MGMT STAFF 1ST 20: CPT | Performed by: INTERNAL MEDICINE

## 2025-08-04 ENCOUNTER — TELEPHONE (OUTPATIENT)
Dept: CARDIOLOGY | Facility: CLINIC | Age: 65
End: 2025-08-04
Payer: MEDICARE

## 2025-08-06 ENCOUNTER — ANESTHESIA EVENT (OUTPATIENT)
Dept: CARDIOLOGY | Facility: HOSPITAL | Age: 65
End: 2025-08-06

## 2025-08-06 ENCOUNTER — TELEPHONE (OUTPATIENT)
Dept: CARDIOLOGY | Facility: CLINIC | Age: 65
End: 2025-08-06
Payer: MEDICARE

## 2025-08-07 ENCOUNTER — ANESTHESIA (OUTPATIENT)
Dept: CARDIOLOGY | Facility: HOSPITAL | Age: 65
End: 2025-08-07

## 2025-08-07 ENCOUNTER — TELEPHONE (OUTPATIENT)
Dept: CARDIOLOGY | Facility: CLINIC | Age: 65
End: 2025-08-07
Payer: MEDICARE

## 2025-08-07 DIAGNOSIS — I48.0 PAF (PAROXYSMAL ATRIAL FIBRILLATION): Primary | ICD-10-CM

## 2025-08-08 ENCOUNTER — PATIENT MESSAGE (OUTPATIENT)
Dept: CARDIOLOGY | Facility: CLINIC | Age: 65
End: 2025-08-08
Payer: MEDICARE

## 2025-08-11 ENCOUNTER — HOSPITAL ENCOUNTER (OUTPATIENT)
Dept: CARDIOLOGY | Facility: HOSPITAL | Age: 65
Discharge: HOME | End: 2025-08-11
Payer: MEDICARE

## 2025-08-11 ENCOUNTER — OFFICE VISIT (OUTPATIENT)
Dept: CARDIOLOGY | Facility: HOSPITAL | Age: 65
End: 2025-08-11
Payer: MEDICARE

## 2025-08-11 VITALS
RESPIRATION RATE: 18 BRPM | WEIGHT: 221 LBS | HEIGHT: 65 IN | OXYGEN SATURATION: 99 % | SYSTOLIC BLOOD PRESSURE: 138 MMHG | DIASTOLIC BLOOD PRESSURE: 76 MMHG | BODY MASS INDEX: 36.82 KG/M2 | HEART RATE: 71 BPM

## 2025-08-11 DIAGNOSIS — I48.0 PAF (PAROXYSMAL ATRIAL FIBRILLATION): ICD-10-CM

## 2025-08-11 DIAGNOSIS — I48.0 PAF (PAROXYSMAL ATRIAL FIBRILLATION): Primary | ICD-10-CM

## 2025-08-11 PROCEDURE — 93005 ELECTROCARDIOGRAM TRACING: CPT | Performed by: NURSE PRACTITIONER

## 2025-08-11 PROCEDURE — 1159F MED LIST DOCD IN RCRD: CPT | Performed by: NURSE PRACTITIONER

## 2025-08-11 PROCEDURE — 3078F DIAST BP <80 MM HG: CPT | Performed by: NURSE PRACTITIONER

## 2025-08-11 PROCEDURE — 3075F SYST BP GE 130 - 139MM HG: CPT | Performed by: NURSE PRACTITIONER

## 2025-08-11 PROCEDURE — 1160F RVW MEDS BY RX/DR IN RCRD: CPT | Performed by: NURSE PRACTITIONER

## 2025-08-11 PROCEDURE — 99214 OFFICE O/P EST MOD 30 MIN: CPT | Performed by: NURSE PRACTITIONER

## 2025-08-12 LAB
QT INTERVAL: 434 MS
QTC INTERVAL: 461 MS

## 2025-08-14 ENCOUNTER — OFFICE (OUTPATIENT)
Dept: URBAN - METROPOLITAN AREA CLINIC 94 | Facility: CLINIC | Age: 65
End: 2025-08-14
Payer: COMMERCIAL

## 2025-08-14 VITALS
HEIGHT: 65 IN | WEIGHT: 222 LBS | HEART RATE: 96 BPM | DIASTOLIC BLOOD PRESSURE: 84 MMHG | SYSTOLIC BLOOD PRESSURE: 130 MMHG

## 2025-08-14 DIAGNOSIS — K92.1 MELENA: ICD-10-CM

## 2025-08-14 DIAGNOSIS — K59.03 DRUG INDUCED CONSTIPATION: ICD-10-CM

## 2025-08-14 DIAGNOSIS — K64.2 THIRD DEGREE HEMORRHOIDS: ICD-10-CM

## 2025-08-14 PROCEDURE — 99214 OFFICE O/P EST MOD 30 MIN: CPT | Performed by: INTERNAL MEDICINE

## 2025-08-14 RX ORDER — TENAPANOR HYDROCHLORIDE 53.2 MG/1
TABLET ORAL
Qty: 180 | Refills: 3 | Status: ACTIVE
Start: 2025-08-14

## 2025-08-26 ENCOUNTER — TELEPHONE (OUTPATIENT)
Dept: CARDIOLOGY | Facility: CLINIC | Age: 65
End: 2025-08-26
Payer: MEDICARE